# Patient Record
Sex: FEMALE | Race: BLACK OR AFRICAN AMERICAN | Employment: OTHER | URBAN - METROPOLITAN AREA
[De-identification: names, ages, dates, MRNs, and addresses within clinical notes are randomized per-mention and may not be internally consistent; named-entity substitution may affect disease eponyms.]

---

## 2017-08-28 ENCOUNTER — OFFICE VISIT (OUTPATIENT)
Dept: INTERNAL MEDICINE CLINIC | Age: 67
End: 2017-08-28

## 2017-08-28 VITALS
HEIGHT: 64 IN | DIASTOLIC BLOOD PRESSURE: 77 MMHG | HEART RATE: 57 BPM | BODY MASS INDEX: 32.5 KG/M2 | OXYGEN SATURATION: 100 % | WEIGHT: 190.4 LBS | TEMPERATURE: 97.8 F | RESPIRATION RATE: 18 BRPM | SYSTOLIC BLOOD PRESSURE: 158 MMHG

## 2017-08-28 DIAGNOSIS — Z78.0 POST-MENOPAUSAL: ICD-10-CM

## 2017-08-28 DIAGNOSIS — Z00.00 MEDICARE ANNUAL WELLNESS VISIT, SUBSEQUENT: Primary | ICD-10-CM

## 2017-08-28 DIAGNOSIS — R03.0 ELEVATED BP WITHOUT DIAGNOSIS OF HYPERTENSION: ICD-10-CM

## 2017-08-28 DIAGNOSIS — E78.2 MIXED HYPERLIPIDEMIA: ICD-10-CM

## 2017-08-28 DIAGNOSIS — Z12.31 ENCOUNTER FOR SCREENING MAMMOGRAM FOR BREAST CANCER: ICD-10-CM

## 2017-08-28 NOTE — MR AVS SNAPSHOT
Visit Information Date & Time Provider Department Dept. Phone Encounter #  
 8/28/2017 10:00 AM Simran Multani, [address] [phone] [id number] Follow-up Instructions Return in about 1 year (around 8/28/2018) for wellness visit. Upcoming Health Maintenance Date Due Hepatitis C Screening 1950 ZOSTER VACCINE AGE 60> 6/30/2008 GLAUCOMA SCREENING Q2Y 8/30/2013 Pneumococcal 65+ Low/Medium Risk (2 of 2 - PCV13) 11/26/2015 FOBT Q 1 YEAR AGE 50-75 6/30/2016 BREAST CANCER SCRN MAMMOGRAM 12/22/2017 MEDICARE YEARLY EXAM 8/29/2018 DTaP/Tdap/Td series (2 - Td) 6/1/2025 Allergies as of 8/28/2017  Review Complete On: 8/28/2017 By: Simran Multani MD  
 No Known Allergies Current Immunizations  Never Reviewed Name Date Influenza Vaccine 11/26/2014 Pneumococcal Conjugate (PCV-13)  Incomplete Pneumococcal Polysaccharide (PPSV-23) 11/26/2014 Tdap 6/1/2015 Not reviewed this visit You Were Diagnosed With   
  
 Codes Comments Medicare annual wellness visit, subsequent    -  Primary ICD-10-CM: Z00.00 ICD-9-CM: V70.0 Elevated BP without diagnosis of hypertension     ICD-10-CM: R03.0 ICD-9-CM: 796.2 Encounter for screening mammogram for breast cancer     ICD-10-CM: Z12.31 
ICD-9-CM: V76.12 Post-menopausal     ICD-10-CM: Z78.0 ICD-9-CM: V49.81 Mixed hyperlipidemia     ICD-10-CM: E78.2 ICD-9-CM: 272.2 Vitals BP Pulse Temp Resp Height(growth percentile) Weight(growth percentile) 158/77 (BP 1 Location: Left arm, BP Patient Position: Sitting) (!) 57 97.8 °F (36.6 °C) (Oral) 18 5' 4\" (1.626 m) 190 lb 6.4 oz (86.4 kg) SpO2 BMI OB Status Smoking Status 100% 32.68 kg/m2 Postmenopausal Former Smoker Vitals History BMI and BSA Data Body Mass Index Body Surface Area  
 32.68 kg/m 2 1.98 m 2 Preferred Pharmacy Pharmacy Name Phone [id number], [place] - [address] [phone] Your Updated Medication List  
  
   
This list is accurate as of: 17 11:14 AM.  Always use your most recent med list.  
  
  
  
  
 calcium 500 mg Tab Take 500 mg by mouth. Indications: HYPOCALCEMIA  
  
 COQ10  100-100 mg-unit Cap Generic drug:  coenzyme q10-vitamin e Take  by mouth.  
  
 krill oil-omega-3-dha-epa 300-90 (27-45) mg Cap Take  by mouth.  
  
 multivitamin tablet Commonly known as:  ONE A DAY Take 1 tablet by mouth daily. varicella zoster vacine live 19,400 unit/0.65 mL Susr injection Commonly known as:  varicella-zoster vacine live 1 Vial by SubCUTAneous route once for 1 dose. Indications: PREVENTION OF HERPES ZOSTER  
  
 VITAMIN D3 2,000 unit Tab Generic drug:  cholecalciferol (vitamin D3) Take  by mouth. Prescriptions Printed Refills  
 varicella zoster vacine live (VARICELLA-ZOSTER VACINE LIVE) 19,400 unit/0.65 mL susr injection 0 Si Vial by SubCUTAneous route once for 1 dose. Indications: PREVENTION OF HERPES ZOSTER Class: Print Route: SubCUTAneous We Performed the Following AMB POC HEMOGLOBIN A1C [82082 CPT(R)] LIPID PANEL [77405 CPT(R)] PNEUMOCOCCAL CONJ VACCINE 13 VALENT IM [id number] CPT(R)] Follow-up Instructions Return in about 1 year (around 2018) for wellness visit. To-Do List   
 2017 Imaging:  DEXA BONE DENSITY STUDY AXIAL   
  
 2017 Imaging:  NOHEMI MAMMO BI SCREENING INCL CAD Patient Instructions PATIENT INSTRUCTIONS:  
Prepared by Carol Wisdom Today's date: 17 Medicare Part B Preventive Services Guidelines/Limitations Date last completed and Frequency Due Date Bone Mass Measurement 
(age 72 & older, biennial) Requires diagnosis related to osteoporosis or estrogen deficiency.  Biennial benefit unless patient has history of long-term glucocorticoid tx or baseline is needed because initial test was by other method, or for patients with vertebral abnormalities on x-ray Completed:  
6-3-15 Recommended every 2 years Due: NOW At your discretion OR As recommended by your PCP or Specialist 
  
Cardiovascular Screening Blood Tests (every 5 years or annual if on cholesterol lowering meds) Total cholesterol, HDL, Triglycerides Order as a panel if possible Completed:  
3-27-15 As recommended by your PCP Due: 
March 2020 OR As recommended by your PCP or Specialist  
Hepatitis B vaccines (covered for patients at high risk- hemophilia, ESRD, DM, body fluid contact Three shot series covered once N/A N/A Zoster Shingles vaccine Covered by Medicare Part D through the pharmacy- PCP provides prescription Completed: No record found Recommended once over age 61  Due: NOW At your discretion This is a one-time vaccine Pneumococcal vaccine (once after age 72) 
 
 
_________________ Prevnar 15  
 
 
 
 
___________________ Completed:  
11-26-14 Recommended once over the age of 72 
__________________ Not Completed: 
 
 
Recommended once over the age of 72 This is a one-time  
vaccine 
 
 
_______________ Due: NOW At your discretion This is a one-time  
vaccine Colorectal Cancer Screening 
-Fecal occult blood test (annual) -Flexible sigmoidoscopy (5y) 
-Screening colonoscopy (10y) -Barium Enema Age 49-80; After age [de-identified] if history of abnormal results Completed:  
 6-30-15 Dr Remberto Riggins recommended repeat in 5 years  Due: 
June 2020 Counseling to Prevent Tobacco Use (up to 8 sessions per year) - Counseling greater than 3 and up to 10 minutes - Counseling greater than 10 minutes Patients must be asymptomatic of tobacco-related conditions to receive as preventive service N/A N/A Diabetes Screening Tests (at least every 3 years, Medicare covers annually or at 6-month intervals for prediabetic patients) Fasting blood sugar (FBS) or glucose tolerance test (GTT) Patient must be diagnosed with one of the following: 
-Hypertension, Dyslipidemia, obesity, previous impaired FBS or GTT 
Or any two of the following: overweight, FH of diabetes, age ? 72, history of gestational diabetes, birth of baby weighing more than 9 pounds Completed: No record found Recommended every 3 years for non-diabetics Recommended every 3-6 months for Pre-Diabetics and Diabetics Due: NOW 
 
OR 
As recommended by your PCP or Specialist 
  
Lung Cancer Screening-with low dose CT for patients who meet all criteria: 
-Age 50-69 
-either a current smoker or have quit in the past 15 yrs 
-have a smoking history of 30 pack years or more 
-have a written order from MD for screening Covered once every 12 months  N/A N/A Glaucoma Screening (no USPSTF recommendation) Covered for high risk patients such as patients with Diabetes mellitus, family history of glaucoma, , age 48 or over,  American, age 72 or over Completed: No record found Referral given for Dr. Pati Costa June 2016 Recommended annually Due: NOW Seasonal Influenza Vaccination (annually)  Completed: 
11-26-14 Recommended Annually Due: FALL 2017 At your discretion TDAP Vaccination Covered by Medicare part D through the pharmacy- PCP provides prescription Completed: 
 6-1-15 Recommended every 10 years Due: June 2025 At your discretion Screening Mammography (biennial age 54-69) Annually (age 36 or over) Completed:  
12-22-15 Due: NOW 
 
OR 
As recommended by your PCP or Specialist 
  
Screening Pap Tests and Pelvic Examination (up to age 79 and after 79 if unknown history or abnormal study last 10 years) Every 24 months except high risk Completed: 
3-26-15 As recommended by your PCP or Specialist 
 Due: NOW 
 
OR 
As recommended by your PCP or Specialist 
  
 HIV Screening (includes patients at high risk and includes any patient that requests the test and pregnant women Covered once every 12 months or up to 3 times during pregnancy N/A N/A Hepatitis C screening tests Indicated for patients with at least one of the following: current or past history of IV drug use, those who had blood transfusion before 1992, those born between [facility]. N/A N/A Please contact your RN/Nurse Navigator with any questions about your visit or instructions today. Thank you for the opportunity for us to participate in your care. Introducing [facility]! Dear Bassam Funk: Thank you for requesting a [employer] account. Our records indicate that you have previously registered for a [employer] account but its currently inactive. Please call our [employer] support line at [phone]. Additional Information If you have questions, please visit the Frequently Asked Questions section of the [employer] website at [email or web address] Remember, [employer] is NOT to be used for urgent needs. For medical emergencies, dial 911. Now available from your iPhone and Android! Please provide this summary of care documentation to your next provider. Your primary care clinician is listed as Morelia Mosley. If you have any questions after today's visit, please call [phone].

## 2017-08-28 NOTE — PROGRESS NOTES
Medicare Wellness Visit      Jamir Heath is a 74 y.o. female and presents for Annual Medicare Wellness Visit. Assessment of cognitive impairment: Alert and oriented x 3. Depression Screen:   PHQ over the last two weeks 8/28/2017   Little interest or pleasure in doing things Not at all   Feeling down, depressed or hopeless Not at all   Total Score PHQ 2 0       Fall Risk Assessment:    Fall Risk Assessment, last 12 mths 8/28/2017   Able to walk? Yes   Fall in past 12 months? No   Fall with injury? -   Number of falls in past 12 months -   Fall Risk Score -       Abuse Screen:   Abuse Screening Questionnaire 8/28/2017   Do you ever feel afraid of your partner? N   Are you in a relationship with someone who physically or mentally threatens you? N   Is it safe for you to go home? Y       Activities of Daily Living:  Self-care. Requires assistance with: no ADLs  Patient handle his/her own medications  yes Use of pill box  yes  Activities of Daily Living:   ADL Assessment 8/28/2017   Feeding yourself No Help Needed   Getting from bed to chair No Help Needed   Getting dressed No Help Needed   Bathing or showering No Help Needed   Walk across the room (includes cane/walker) No Help Needed   Using the telphone No Help Needed   Taking your medications No Help Needed   Preparing meals No Help Needed   Managing money (expenses/bills) No Help Needed   Moderately strenuous housework (laundry) No Help Needed   Shopping for personal items (toiletries/medicines) No Help Needed   Shopping for groceries No Help Needed   Driving No Help Needed   Climbing a flight of stairs No Help Needed   Getting to places beyond walking distances No Help Needed       Health Maintenance:  Daily Aspirin: no  Bone Density: not up to date - last done 6-3-15. Reviewed and recommended. Patient will consider for future  Glaucoma Screening: no-Reviewed and recommended.   Patient reports she will schedule eye exam soon  Immunizations:    Tetanus: up to date. Influenza: up to date. Next due: Fall 2017  Shingles: not up to date - patient agrees to immunization. Order pended for physician signature. PPSV-23: up to date. Prevnar-13: not up to date - patient agrees to immunization. Order pended for physician signature. .    Cancer screening:    Cervical: not up to date - Reviewed and recommended. Patient reports she will schedule. Breast: not up to date - Reviewed and recommended. Patient reports she will schedule. Colon: up to date-last colonoscopy 6-30-15 with Dr. Tesfaye Sharma; follow up in 5 years. Alcohol Risk Screen:   On any occasion during the past 3 months, have you had more than 3 drinks(female) or 4 drinks (male) containing alcohol in one? No  Do you average more than 7 drinks (female) or 14 drinks (male) per week? No  Type and amount:patient reports drinking occasional mixed drink    Hearing Loss:  denies any hearing loss    Vision Loss:   Wears glasses, contact lenses, or have any other visual impairment  Yes wears glasses for reading    Adult Nutrition Screen:  No risk factors noted. Advance Care Planning:   End of Life Planning: has NO advanced directive  - add't info provided      Medications/Allergies: Reviewed with patient  Prior to Admission medications    Medication Sig Start Date End Date Taking? Authorizing Provider   calcium 500 mg tab Take 500 mg by mouth. Indications: HYPOCALCEMIA   Yes Historical Provider   multivitamin (ONE A DAY) tablet Take 1 tablet by mouth daily. Yes Historical Provider   krill oil-omega-3-dha-epa 300-90 (27-45) mg cap Take  by mouth. Yes Historical Provider   coenzyme q10-vitamin e (COQ10 ) 100-100 mg-unit cap Take  by mouth. Yes Historical Provider   cholecalciferol, vitamin D3, (VITAMIN D3) 2,000 unit tab Take  by mouth.    Yes Historical Provider       No Known Allergies    Objective:  Visit Vitals    /77 (BP 1 Location: Left arm, BP Patient Position: Sitting)    Pulse (!) 57    Temp 97.8 °F (36.6 °C) (Oral)    Resp 18    Ht 5' 4\" (1.626 m)    Wt 190 lb 6.4 oz (86.4 kg)    SpO2 100%    BMI 32.68 kg/m2    Body mass index is 32.68 kg/(m^2). Problem List: Reviewed with patient and discussed risk factors. Patient Active Problem List   Diagnosis Code    Abnormal EKG R94.31    Murmur R01.1       PSH: Reviewed with patient  Past Surgical History:   Procedure Laterality Date    HX BUNIONECTOMY      HX  SECTION      HX ORTHOPAEDIC      left knee    HX OTHER SURGICAL      dental implant        SH: Reviewed with patient  Social History   Substance Use Topics    Smoking status: Former Smoker     Years: 20.00     Quit date: 10/26/2004    Smokeless tobacco: Never Used    Alcohol use Yes      Comment: occasionally       FH: Reviewed with patient  Family History   Problem Relation Age of Onset    Hypertension Mother     Heart Disease Mother     Alzheimer Mother     Hypertension Father     Hypertension Sister     Hypertension Brother     Alzheimer Maternal Aunt     Alzheimer Maternal Grandmother     Stroke Maternal Grandfather        Current medical providers:    Patient Care Team:  Rowan Ibrahim MD as PCP - General (Family Practice)  Magdy Bosch MD as Physician (Cardiology)  Herminia Concepcion MD as Physician (Gastroenterology)  Wanda Pacheco MD as Physician (Orthopedic Surgery)    Plan:      No orders of the defined types were placed in this encounter. 1. Patient will schedule and maintain health maintenance screens as discussed this visit  2. Patient will continue current exercise program  3. Patient will complete ADV document and supply to office for scanning into chart. Provided Right to Decide booklet and blank AMD document.       Health Maintenance   Topic Date Due    Hepatitis C Screening  1950    ZOSTER VACCINE AGE 60>  2008    GLAUCOMA SCREENING Q2Y  2013    Pneumococcal 65+ Low/Medium Risk (2 of 2 - PCV13) 11/26/2015    FOBT Q 1 YEAR AGE 50-75  06/30/2016    BREAST CANCER SCRN MAMMOGRAM  12/22/2017    MEDICARE YEARLY EXAM  08/29/2018    DTaP/Tdap/Td series (2 - Td) 06/01/2025    OSTEOPOROSIS SCREENING (DEXA)  Completed    INFLUENZA AGE 9 TO ADULT  Addressed         Urinary/ Fecal Incontinence: No    Regular physical exercise: Yes works in yard and uses a push  regularly    Medication reconciliation completed by MA and reviewed by provider. Family history and Care Team reviewed and updated. Patient provided with a copy of After Visit Summary and Medicare Part B Preventive Services Table. See table for findings under Recommendation and Scheduled. Patient Verbalized understanding of all information discussed       I have reviewed the information regarding the Medicare Annual Well Visit as documented by my nurse navigator; Agree with assessment.  Luis Enrique Stratton MD

## 2017-08-28 NOTE — PROGRESS NOTES
SUBJECTIVE:   Ms. Justus Lira is a 74 y.o. female who is here for follow up and her annual medicare wellness visit. Pt's BP is elevated at 158/77 today. Pt reports SBP averages 130-140s at home. Pt reports drinking 6 cups of coffee with some cream and sugar daily. Pt denies BLE swelling. Pt has lost 2 lbs since 2016. Pt reports walking and doing yard work for exercise. ROUTINE HEALTH MAINTENANCE:   Mammogram: due   Dexa: due   Shingles: prescription printed today     At this time, she is otherwise doing well and has brought no other complaints to my attention today. For a list of the medical issues addressed today, see the assessment and plan below. PMH: History reviewed. No pertinent past medical history. PSH:  has a past surgical history that includes  section; bunionectomy; orthopaedic; and other surgical.    All: has No Known Allergies. MEDS:   Current Outpatient Prescriptions   Medication Sig    varicella zoster vacine live (VARICELLA-ZOSTER VACINE LIVE) 19,400 unit/0.65 mL susr injection 1 Vial by SubCUTAneous route once for 1 dose. Indications: PREVENTION OF HERPES ZOSTER    calcium 500 mg tab Take 500 mg by mouth. Indications: HYPOCALCEMIA    multivitamin (ONE A DAY) tablet Take 1 tablet by mouth daily.  krill oil-omega-3-dha-epa 300-90 (27-45) mg cap Take  by mouth.  coenzyme q10-vitamin e (COQ10 ) 100-100 mg-unit cap Take  by mouth.  cholecalciferol, vitamin D3, (VITAMIN D3) 2,000 unit tab Take  by mouth. No current facility-administered medications for this visit. FH: family history includes Alzheimer in her maternal aunt, maternal grandmother, and mother; Heart Disease in her mother; Hypertension in her brother, father, mother, and sister; Stroke in her maternal grandfather. SH:  reports that she quit smoking about 12 years ago. She quit after 20.00 years of use. She has never used smokeless tobacco. She reports that she drinks alcohol.  She reports that she does not use illicit drugs. Review of Systems - History obtained from the patient  General ROS: negative  Psychological ROS: negative  Ophthalmic ROS: negative  ENT ROS: negative  Respiratory ROS: no cough, shortness of breath, or wheezing  Cardiovascular ROS: no chest pain or dyspnea on exertion  Gastrointestinal ROS: no abdominal pain, change in bowel habits, or black or bloody stools  Genito-Urinary ROS: negative  Musculoskeletal ROS: negative  Neurological ROS: negative  Dermatological ROS: negative    OBJECTIVE:   Vitals:   Visit Vitals    /77 (BP 1 Location: Left arm, BP Patient Position: Sitting)    Pulse (!) 57    Temp 97.8 °F (36.6 °C) (Oral)    Resp 18    Ht 5' 4\" (1.626 m)    Wt 190 lb 6.4 oz (86.4 kg)    SpO2 100%    BMI 32.68 kg/m2      Gen: Pleasant 74 y.o.  female in NAD. HEENT: NC/AT. HEART: RRR, No M/G/R. LUNGS: CTAB No W/R. EXTREMITIES: Warm. No C/C/E. NEURO: Alert and oriented x 3. Cranial nerves grossly intact. No focal sensory or motor deficits noted. SKIN: Warm. Dry. No rashes or other lesions noted. ASSESSMENT/ PLAN:     Diagnoses and all orders for this visit:    1. Medicare annual wellness visit, subsequent  -     AMB POC HEMOGLOBIN A1C  -     LIPID PANEL  -     PNEUMOCOCCAL CONJ VACCINE 13 VALENT IM  -     varicella zoster vacine live (VARICELLA-ZOSTER VACINE LIVE) 19,400 unit/0.65 mL susr injection; 1 Vial by SubCUTAneous route once for 1 dose. Indications: PREVENTION OF HERPES ZOSTER    2. Elevated BP without diagnosis of hypertension    3. Encounter for screening mammogram for breast cancer  -     [facility] MAMMO BI SCREENING INCL CAD; Future    4. Post-menopausal  -     DEXA BONE DENSITY STUDY AXIAL; Future    5. Mixed hyperlipidemia  -     LIPID PANEL      Pt was given lab orders for an Hgb A1c and lipid panel to have done when she is fasting. Pt was advised to continue checking her BP at home daily and send readings to this office.  Pt was advised to reduce caffeine intake to 3 cups of coffee daily rather than 6. Pt was instructed to monitor the amount of cream she uses in her coffee, and use [place] or another artificial sweetener in place of real sugar. Pt was given the Prevnar 13 vaccine in the office today and given a printed prescription for Zostavax. I ordered a mammogram for routine breast cancer screening. I ordered a Dexa bone density scan for routine monitoring of post-menopausal changes. Pt will f/u in one year for wellness visit. Follow-up Disposition:  Return in about 1 year (around 8/28/2018) for wellness visit. I have reviewed the patient's medications and risks/side effects/benefits were discussed. Diagnosis(-es) explained to patient and questions answered. Literature provided where appropriate.      Written by Tameka Alvarez, as dictated by Henna Cobos MD.

## 2017-08-28 NOTE — PATIENT INSTRUCTIONS
PATIENT INSTRUCTIONS:   Prepared by Stacey Nieto  Today's date: 8-28-17  Medicare Part B Preventive Services Guidelines/Limitations Date last completed and Frequency Due Date   Bone Mass Measurement  (age 72 & older, biennial) Requires diagnosis related to osteoporosis or estrogen deficiency. Biennial benefit unless patient has history of long-term glucocorticoid tx or baseline is needed because initial test was by other method, or for patients with vertebral abnormalities on x-ray Completed:   6-3-15    Recommended every 2 years Due: NOW    At your discretion    OR  As recommended by your PCP or Specialist     Cardiovascular Screening Blood Tests (every 5 years or annual if on cholesterol lowering meds)  Total cholesterol, HDL, Triglycerides Order as a panel if possible Completed:   3-27-15    As recommended by your PCP Due:  March 2020    OR  As recommended by your PCP or Specialist   Hepatitis B vaccines  (covered for patients at high risk- hemophilia, ESRD, DM, body fluid contact   Three shot series covered once     N/A N/A   Zoster Shingles vaccine Covered by Medicare Part D through the pharmacy- PCP provides prescription Completed:   No record found    Recommended once over age 61  Due: NOW    At your discretion    This is a one-time vaccine   Pneumococcal vaccine (once after age 72)      _________________ Isamar Naas 15           ___________________ Completed:   11-26-14    Recommended once over the age of 72  __________________  Not Completed:      Recommended once over the age of 72   This is a one-time   vaccine      _______________  Due: NOW    At your discretion    This is a one-time   vaccine   Colorectal Cancer Screening  -Fecal occult blood test (annual)  -Flexible sigmoidoscopy (5y)  -Screening colonoscopy (10y)  -Barium Enema Age 49-80;  After age [de-identified] if history of abnormal results Completed:    6-30-15     Dr Rene Coelho recommended repeat in 5 years  Due:  June 2020         Counseling to Prevent Tobacco Use (up to 8 sessions per year)  - Counseling greater than 3 and up to 10 minutes  - Counseling greater than 10 minutes   Patients must be asymptomatic of tobacco-related conditions to receive as preventive service N/A N/A   Diabetes Screening Tests (at least every 3 years, Medicare covers annually or at 6-month intervals for prediabetic patients)    Fasting blood sugar (FBS) or glucose tolerance test (GTT) Patient must be diagnosed with one of the following:  -Hypertension, Dyslipidemia, obesity, previous impaired FBS or GTT  Or any two of the following: overweight, FH of diabetes, age ? 72, history of gestational diabetes, birth of baby weighing more than 9 pounds Completed:   No record found    Recommended every 3 years for non-diabetics      Recommended every 3-6 months for Pre-Diabetics and Diabetics Due: NOW    OR  As recommended by your PCP or Specialist     Lung Cancer Screening-with low dose CT for patients who meet all criteria:  -Age 50-69  -either a current smoker or have quit in the past 15 yrs  -have a smoking history of 30 pack years or more  -have a written order from MD for screening   Covered once every 12 months  N/A N/A   Glaucoma Screening (no USPSTF recommendation) Covered for high risk patients such as patients with Diabetes mellitus, family history of glaucoma, , age 48 or over,  American, age 72 or over Completed:   No record found  Referral given for Dr. Lorrie Ortiz June 2016    Recommended annually Due: NOW   Seasonal Influenza Vaccination (annually)  Completed:  11-26-14     Recommended Annually Due: FALL 2017    At your discretion   TDAP Vaccination Covered by Medicare part D through the pharmacy- PCP provides prescription Completed:   6-1-15    Recommended every 10 years Due: June 2025    At your discretion   Screening Mammography (biennial age 54-69) Annually (age 36 or over) Completed:   12-22-15 Due: NOW    OR  As recommended by your PCP or Specialist     Screening Pap Tests and Pelvic Examination (up to age 79 and after 79 if unknown history or abnormal study last 10 years)   Every 24 months except high risk Completed:  3-26-15    As recommended by your PCP or Specialist   Due: NOW    OR  As recommended by your PCP or Specialist     HIV Screening (includes patients at high risk and includes any patient that requests the test and pregnant women   Covered once every 12 months or up to 3 times during pregnancy N/A N/A   Hepatitis C screening tests Indicated for patients with at least one of the following: current or past history of IV drug use, those who had blood transfusion before 1992, those born between [facility]. N/A N/A     Please contact your RN/Nurse Navigator with any questions about your visit or instructions today. Thank you for the opportunity for us to participate in your care.

## 2017-08-28 NOTE — PROGRESS NOTES
Chief Complaint   Patient presents with   [facility] Visit          1. Have you been to the ER, urgent care clinic since your last visit? Hospitalized since your last visit? No    2. Have you seen or consulted any other health care providers outside of the [address] [place] [address] since your last visit? Include any pap smears or colon screening.  No

## 2017-08-29 LAB
CHOLEST SERPL-MCNC: 223 MG/DL (ref 100–199)
HDLC SERPL-MCNC: 83 MG/DL
LDLC SERPL CALC-MCNC: 126 MG/DL (ref 0–99)
TRIGL SERPL-MCNC: 68 MG/DL (ref 0–149)
VLDLC SERPL CALC-MCNC: 14 MG/DL (ref 5–40)

## 2017-10-02 ENCOUNTER — HOSPITAL ENCOUNTER (OUTPATIENT)
Dept: BONE DENSITY | Age: 67
Discharge: HOME | End: 2017-10-02
Attending: FAMILY MEDICINE
Payer: MEDICARE

## 2017-10-02 ENCOUNTER — HOSPITAL ENCOUNTER (OUTPATIENT)
Dept: MAMMOGRAPHY | Age: 67
Discharge: HOME | End: 2017-10-02
Attending: FAMILY MEDICINE
Payer: MEDICARE

## 2017-10-02 DIAGNOSIS — Z12.31 ENCOUNTER FOR SCREENING MAMMOGRAM FOR BREAST CANCER: ICD-10-CM

## 2017-10-02 DIAGNOSIS — Z78.0 POST-MENOPAUSAL: ICD-10-CM

## 2017-10-02 PROCEDURE — 77080 DXA BONE DENSITY AXIAL: CPT

## 2017-10-02 PROCEDURE — 77067 SCR MAMMO BI INCL CAD: CPT

## 2017-10-06 ENCOUNTER — TELEPHONE (OUTPATIENT)
Dept: INTERNAL MEDICINE CLINIC | Age: 67
End: 2017-10-06

## 2017-10-06 NOTE — TELEPHONE ENCOUNTER
----- Message from Kina Raya MD sent at 10/5/2017 10:55 PM EDT -----  Please inform patient that she has osteopenia. She should take vit d 400 units bid and 600 mg of calcium bid. Encourage weight bearing exercise.

## 2017-10-06 NOTE — PROGRESS NOTES
Please inform patient that she has osteopenia. She should take vit d 400 units bid and 600 mg of calcium bid. Encourage weight bearing exercise.

## 2017-10-06 NOTE — TELEPHONE ENCOUNTER
Identified patient 2 identifiers verified. Patient aware of test results and plan of care. No further questions at this time.

## 2019-02-27 ENCOUNTER — OFFICE VISIT (OUTPATIENT)
Dept: INTERNAL MEDICINE CLINIC | Age: 69
End: 2019-02-27

## 2019-02-27 VITALS
HEART RATE: 54 BPM | BODY MASS INDEX: 32.3 KG/M2 | OXYGEN SATURATION: 98 % | WEIGHT: 182.3 LBS | SYSTOLIC BLOOD PRESSURE: 130 MMHG | DIASTOLIC BLOOD PRESSURE: 75 MMHG | RESPIRATION RATE: 18 BRPM | HEIGHT: 63 IN | TEMPERATURE: 97.6 F

## 2019-02-27 DIAGNOSIS — Z00.00 MEDICARE ANNUAL WELLNESS VISIT, SUBSEQUENT: Primary | ICD-10-CM

## 2019-02-27 DIAGNOSIS — Z13.6 SCREENING FOR ISCHEMIC HEART DISEASE: ICD-10-CM

## 2019-02-27 DIAGNOSIS — Z12.31 ENCOUNTER FOR SCREENING MAMMOGRAM FOR MALIGNANT NEOPLASM OF BREAST: ICD-10-CM

## 2019-02-27 DIAGNOSIS — Z13.39 SCREENING FOR ALCOHOLISM: ICD-10-CM

## 2019-02-27 DIAGNOSIS — Z13.31 SCREENING FOR DEPRESSION: ICD-10-CM

## 2019-02-27 DIAGNOSIS — Z11.59 NEED FOR HEPATITIS C SCREENING TEST: ICD-10-CM

## 2019-02-27 NOTE — PROGRESS NOTES
This is the Subsequent Medicare Annual Wellness Exam, performed 12 months or more after the Initial AWV or the last Subsequent AWV I have reviewed the patient's medical history in detail and updated the computerized patient record. History History reviewed. No pertinent past medical history. Past Surgical History:  
Procedure Laterality Date  HX BUNIONECTOMY  HX  SECTION    
 HX ORTHOPAEDIC    
 left knee  HX OTHER SURGICAL    
 dental implant Current Outpatient Medications Medication Sig Dispense Refill  varicella-zoster recombinant, PF, (SHINGRIX, PF,) 50 mcg/0.5 mL susr injection 0.5mL by IntraMUSCular route once now and then repeat in 2-6 months 0.5 mL 1  calcium 500 mg tab Take 500 mg by mouth. Indications: HYPOCALCEMIA  multivitamin (ONE A DAY) tablet Take 1 tablet by mouth daily.  krill oil-omega-3-dha-epa 300-90 (27-45) mg cap Take  by mouth.  coenzyme q10-vitamin e (COQ10 ) 100-100 mg-unit cap Take  by mouth.  cholecalciferol, vitamin D3, (VITAMIN D3) 2,000 unit tab Take  by mouth. No Known Allergies Family History Problem Relation Age of Onset  Hypertension Mother  Heart Disease Mother  Alzheimer Mother  Hypertension Father  Hypertension Sister  Hypertension Brother  Alzheimer Maternal Aunt  Alzheimer Maternal Grandmother  Stroke Maternal Grandfather Social History Tobacco Use  Smoking status: Former Smoker Years: 20.00 Last attempt to quit: 10/26/2004 Years since quittin.3  Smokeless tobacco: Never Used Substance Use Topics  Alcohol use: Yes Comment: occasionally Patient Active Problem List  
Diagnosis Code  Abnormal EKG R94.31  
 Murmur R01.1 Depression Risk Factor Screening:  
 
3 most recent PHQ Screens 2019 Little interest or pleasure in doing things Not at all Feeling down, depressed, irritable, or hopeless Not at all Total Score PHQ 2 0 Alcohol Risk Factor Screening: You do not drink alcohol or very rarely. Functional Ability and Level of Safety:  
Hearing Loss Hearing is good. Activities of Daily Living The home contains: no safety equipment. Patient does total self care Fall Risk Fall Risk Assessment, last 12 mths 2/27/2019 Able to walk? Yes Fall in past 12 months? No  
Fall with injury? -  
Number of falls in past 12 months - Fall Risk Score -  
 
 
Abuse Screen Patient is not abused Cognitive Screening Evaluation of Cognitive Function: 
Has your family/caregiver stated any concerns about your memory: yes Normal MMSE and Clock test 
 
Patient Care Team  
Patient Care Team: 
Evy Carson MD as PCP - [facility]) Estelita Del Rio MD as Physician (Cardiology) Moses Waldron MD as Physician (Gastroenterology) Shannan Jensen MD as Physician (Orthopedic Surgery) Assessment/Plan Education and counseling provided: 
Are appropriate based on today's review and evaluation End-of-Life planning (with patient's consent) Screening Mammography Cardiovascular screening blood test 
Screening for glaucoma Diagnoses and all orders for this visit: 
 
1. Medicare annual wellness visit, subsequent 
-     varicella-zoster recombinant, PF, (SHINGRIX, PF,) 50 mcg/0.5 mL susr injection; 0.5mL by IntraMUSCular route once now and then repeat in 2-6 months 2. Screening for alcoholism -     [place] ANNUAL ALCOHOL SCREEN 15 MIN 3. Screening for depression 
-     [address] 4. Screening for ischemic heart disease -     LIPID PANEL 5. Need for hepatitis C screening test 
-     HEPATITIS C AB 
 
6. Encounter for screening mammogram for malignant neoplasm of breast 
-     NOHEMI MAMMO BI SCREENING INCL CAD; Future Health Maintenance Due Topic Date Due  
 Hepatitis C Screening  1950  Shingrix Vaccine Age 50> (1 of 2) 08/30/1998  GLAUCOMA SCREENING Q2Y  08/30/2013  MEDICARE YEARLY EXAM  10/15/2018 PREVENTIVE: 
Colonoscopy: 6/30/15, Dr. Margy Lozano, 5yr f/u Mammogram: due; 10/2/17 Dexa: 10/2/17, osteopenic Tdap: 6/1/15 Pneumonia vaccine: PPSV-23: 11/26/14, PCV-13: 8/28/17 Shingrix: accepted, prescription provided Flu: 1/17/19 Eye Exam: due 
  
      ACP- Patient brought in a copy of her ACP today.

## 2019-02-27 NOTE — PROGRESS NOTES
Reviewed record in preparation for visit and have obtained necessary documentation. Identified pt with two pt identifiers(name and ). Chief Complaint Patient presents with [facility] [place] Annual Wellness Visit Health Maintenance Due Topic Date Due  
 Hepatitis C Test  1950  Shingles Vaccine (1 of 2) 1998  Glaucoma Screening   2013  Stool testing for trace blood  2016 [facility] [place] Annual Well Visit  10/15/2018 Ms. Augustin Freeman has a reminder for a \"due or due soon\" health maintenance. I have asked that she discuss this further with her primary care provider for follow-up on this health maintenance. Coordination of Care Questionnaire: 
:  
 
1) Have you been to an emergency room, urgent care clinic since your last visit? no  
 
Hospitalized since your last visit? no          
 
2) Have you seen or consulted any other health care providers outside of [address] [place] [address] since your last visit? no  (Include any pap smears or colon screenings in this section.) 3) In the event something were to happen to you and you were unable to speak on your behalf, do you have an Advance Directive/ Living Will in place stating your wishes? NO Do you have an Advance Directive on file? no 
 
4) Are you interested in receiving information on Advance Directives? YES Patient is accompanied by self I have received verbal consent from [facility] to discuss any/all medical information while they are present in the room.

## 2019-02-27 NOTE — PROGRESS NOTES
SUBJECTIVE:  
Ms. Jonna Sparks is a 77 y.o. female who is here for follow up of routine medical issues and Medicare Wellness Visit. Pt's weight today in the office was 182lb, down 8lb x . She reports she has increased her daily activity. Pt's BP was normal at 130/75. Pt endorses good energy levels. Pt reports memory changes. Pt reports she plays mentally stimulating games online. Pt notes her mother had Alzheimer's. Pt accepted a MMSE in the office today. Pt recently returned from a trip to [place]. Pt reports prior to her trip she went to the [facility] travel clinic and received the first immunizations for Hep A and Hep B. Pt reports she was told to complete the second dose. Pt denies CP, SOB, fatigue, changes in bowel habits, problems urinating, edema, skin lesions of concern. PREVENTIVE: 
Colonoscopy: 6/30/15, Dr. Tesfaye Sharma, 5yr f/u Mammogram: due; 10/2/17 Dexa: 10/2/17, osteopenic Tdap: 6/1/15 Pneumonia vaccine: PPSV-23: 14, PCV-13: 17 Shingrix: accepted, prescription provided Flu: 19 Eye Exam: due At this time, she is otherwise doing well and has brought no other complaints to my attention today. For a list of the medical issues addressed today, see the assessment and plan below. PMH: History reviewed. No pertinent past medical history. PSH:  has a past surgical history that includes hx  section; hx bunionectomy; hx orthopaedic; and hx other surgical. 
 
All: has No Known Allergies. MEDS:  
Current Outpatient Medications Medication Sig  varicella-zoster recombinant, PF, (SHINGRIX, PF,) 50 mcg/0.5 mL susr injection 0.5mL by IntraMUSCular route once now and then repeat in 2-6 months  calcium 500 mg tab Take 500 mg by mouth. Indications: HYPOCALCEMIA  multivitamin (ONE A DAY) tablet Take 1 tablet by mouth daily.  krill oil-omega-3-dha-epa 300-90 (27-45) mg cap Take  by mouth.  coenzyme q10-vitamin e (COQ10 ) 100-100 mg-unit cap Take  by mouth.  cholecalciferol, vitamin D3, (VITAMIN D3) 2,000 unit tab Take  by mouth. No current facility-administered medications for this visit. FH: family history includes Alzheimer in her maternal aunt, maternal grandmother, and mother; Heart Disease in her mother; Hypertension in her brother, father, mother, and sister; Stroke in her maternal grandfather. SH:  reports that she quit smoking about 14 years ago. She quit after 20.00 years of use. she has never used smokeless tobacco. She reports that she drinks alcohol. She reports that she does not use drugs. Review of Systems - History obtained from the patient General ROS: no fever, chills, fatigue, body aches Psychological ROS: no change in anxiety, depression, SI/HI Ophthalmic ROS: no blurred vision, myopia, double vision ENT ROS: no dysphagia, otalgia, otorrhea, rhinorrhea, post nasal drip Respiratory ROS: no cough, shortness of breath, or wheezing Cardiovascular ROS: no chest pain or dyspnea on exertion Gastrointestinal ROS: no abdominal pain, change in bowel habits, or black or bloody stools Genito-Urinary ROS: no frequency, urgency, incontinence, dysuria, hematuria Musculoskeletal ROS: no arthralgia, myalgia Neurological ROS: memory changes no headaches, dizziness, lightheadedness, tremors, seizures Dermatological ROS: no rash or lesions OBJECTIVE:  
Vitals:  
Visit Vitals /75 (BP 1 Location: Left arm, BP Patient Position: Sitting) Pulse (!) 54 Temp 97.6 °F (36.4 °C) (Oral) Resp 18 Ht 5' 3\" (1.6 m) Wt 182 lb 4.8 oz (82.7 kg) SpO2 98% BMI 32.29 kg/m² Gen: Pleasant 77 y.o.  female in NAD. HEENT: PERRLA. EOMI. OP moist and pink. TMs clear and canals equal BL. Neck: Supple. No LAD. HEART: RRR, No M/G/R.     
LUNGS: CTAB No W/R. ABDOMEN: S, NT, ND, BS+ EXTREMITIES: Warm. No C/C/E. MUSCULOSKELETAL: Normal ROM, muscle strength 5/5 all groups. NEURO: Alert and oriented x 3. Cranial nerves grossly intact. No focal sensory or motor deficits noted. SKIN: Warm. Dry. No rashes or other lesions noted. ASSESSMENT/ PLAN: Diagnoses and all orders for this visit: 
 
1. Medicare annual wellness visit, subsequent 
-     varicella-zoster recombinant, PF, (SHINGRIX, PF,) 50 mcg/0.5 mL susr injection; 0.5mL by IntraMUSCular route once now and then repeat in 2-6 months -     METABOLIC PANEL, COMPREHENSIVE 
-     CBC WITH AUTOMATED DIFF 2. Screening for alcoholism -     [place] ANNUAL ALCOHOL SCREEN 15 MIN 3. Screening for depression 
-     [address] 4. Screening for ischemic heart disease -     LIPID PANEL 5. Need for hepatitis C screening test 
-     HEPATITIS C AB 
 
6. Encounter for screening mammogram for malignant neoplasm of breast 
-     NOHEMI MAMMO BI SCREENING INCL CAD; Future ICD-10-CM ICD-9-CM 1. Medicare annual wellness visit, subsequent Z00.00 V70.0 varicella-zoster recombinant, PF, (SHINGRIX, PF,) 50 mcg/0.5 mL susr injection METABOLIC PANEL, COMPREHENSIVE  
   CBC WITH AUTOMATED DIFF 2. Screening for alcoholism Z13.39 V79.1 [place] ANNUAL ALCOHOL SCREEN 15 MIN 3. Screening for depression Z13.31 V79.0 [address] 4. Screening for ischemic heart disease Z13.6 V81.0 LIPID PANEL 5. Need for hepatitis C screening test Z11.59 V73.89 HEPATITIS C AB  
6. Encounter for screening mammogram for malignant neoplasm of breast Z12.31 V76.12 NOHEMI MAMMO BI SCREENING INCL CAD 1. Medicare Annual Wellness Visit See attached note. Pt was provided a prescription for shingrix to follow up at the pharmacy. A MMSE was performed in the office today; pt performed well with 100% accuracy. Recheck CMP and CBC. 2. Screening for alcoholism 3. Screening for depression 4. Screening for Ischemic Heart Disease Recheck lipid panel. 5. Hep C Screening Pt is a part of the age group that may have been exposed to Hep C in the past. I ordered a Hep C screening to test for exposure. 6. Screening mammogram 
Pt is due for a mammogram. Order given. Pt will follow up for her Hep B and Hep A vaccinations. Follow-up Disposition: 
Return in about 1 year (around 2/27/2020) for [employer]. I have reviewed the patient's medications and risks/side effects/benefits were discussed. Diagnosis(-es) explained to patient and questions answered. Literature provided where appropriate.   
 
Written by Marcelino Holstein, as dictated by Henna Cobos MD.

## 2019-02-27 NOTE — PATIENT INSTRUCTIONS
Medicare Wellness Visit, Female The best way to live healthy is to have a lifestyle where you eat a well-balanced diet, exercise regularly, limit alcohol use, and quit all forms of tobacco/nicotine, if applicable. Regular preventive services are another way to keep healthy. Preventive services (vaccines, screening tests, monitoring & exams) can help personalize your care plan, which helps you manage your own care. Screening tests can find health problems at the earliest stages, when they are easiest to treat. [facility] follows the current, evidence-based guidelines published by the [facility] Denilson Nicholas ([facility]) when recommending preventive services for our patients. Because we follow these guidelines, sometimes recommendations change over time as research supports it. (For example, mammograms used to be recommended annually. Even though Medicare will still pay for an annual mammogram, the newer guidelines recommend a mammogram every two years for women of average risk.) Of course, you and your doctor may decide to screen more often for some diseases, based on your risk and your health status. Preventive services for you include: - Medicare offers their members a free annual wellness visit, which is time for you and your primary care provider to discuss and plan for your preventive service needs. Take advantage of this benefit every year! 
-All adults over the age of 72 should receive the recommended pneumonia vaccines. Current USPSTF guidelines recommend a series of two vaccines for the best pneumonia protection.  
-All adults should have a flu vaccine yearly and a tetanus vaccine every 10 years. All adults age 61 and older should receive a shingles vaccine once in their lifetime.   
-A bone mass density test is recommended when a woman turns 65 to screen for osteoporosis. This test is only recommended one time, as a screening. Some providers will use this same test as a disease monitoring tool if you already have osteoporosis. -All adults age 38-68 who are overweight should have a diabetes screening test once every three years.  
-Other screening tests and preventive services for persons with diabetes include: an eye exam to screen for diabetic retinopathy, a kidney function test, a foot exam, and stricter control over your cholesterol.  
-Cardiovascular screening for adults with routine risk involves an electrocardiogram (ECG) at intervals determined by your doctor.  
-Colorectal cancer screenings should be done for adults age 54-65 with no increased risk factors for colorectal cancer. There are a number of acceptable methods of screening for this type of cancer. Each test has its own benefits and drawbacks. Discuss with your doctor what is most appropriate for you during your annual wellness visit. The different tests include: colonoscopy (considered the best screening method), a fecal occult blood test, a fecal DNA test, and sigmoidoscopy. -Breast cancer screenings are recommended every other year for women of normal risk, age 54-69. 
-Cervical cancer screenings for women over age 72 are only recommended with certain risk factors.  
-All adults born between [facility] should be screened once for Hepatitis C. Here is a list of your current Health Maintenance items (your personalized list of preventive services) with a due date: 
Health Maintenance Due Topic Date Due  
 Hepatitis C Test  1950  Shingles Vaccine (1 of 2) 08/30/1998  Glaucoma Screening   08/30/2013 [place] Annual Well Visit  10/15/2018

## 2019-02-28 LAB
ALBUMIN SERPL-MCNC: 4.1 G/DL (ref 3.5–4.8)
ALBUMIN/GLOB SERPL: 1.5 {RATIO} (ref 1.2–2.2)
ALP SERPL-CCNC: 86 IU/L (ref 39–117)
ALT SERPL-CCNC: 17 IU/L (ref 0–32)
AST SERPL-CCNC: 18 IU/L (ref 0–40)
BASOPHILS # BLD AUTO: 0 X10E3/UL (ref 0–0.2)
BASOPHILS NFR BLD AUTO: 1 %
BILIRUB SERPL-MCNC: <0.2 MG/DL (ref 0–1.2)
BUN SERPL-MCNC: 13 MG/DL (ref 8–27)
BUN/CREAT SERPL: 15 (ref 12–28)
CALCIUM SERPL-MCNC: 9.6 MG/DL (ref 8.7–10.3)
CHLORIDE SERPL-SCNC: 106 MMOL/L (ref 96–106)
CHOLEST SERPL-MCNC: 211 MG/DL (ref 100–199)
CO2 SERPL-SCNC: 21 MMOL/L (ref 20–29)
CREAT SERPL-MCNC: 0.86 MG/DL (ref 0.57–1)
EOSINOPHIL # BLD AUTO: 0.1 X10E3/UL (ref 0–0.4)
EOSINOPHIL NFR BLD AUTO: 3 %
ERYTHROCYTE [DISTWIDTH] IN BLOOD BY AUTOMATED COUNT: 14 % (ref 12.3–15.4)
GLOBULIN SER CALC-MCNC: 2.7 G/DL (ref 1.5–4.5)
GLUCOSE SERPL-MCNC: 86 MG/DL (ref 65–99)
HCT VFR BLD AUTO: 37.8 % (ref 34–46.6)
HCV AB S/CO SERPL IA: <0.1 S/CO RATIO (ref 0–0.9)
HDLC SERPL-MCNC: 84 MG/DL
HGB BLD-MCNC: 12.3 G/DL (ref 11.1–15.9)
IMM GRANULOCYTES # BLD AUTO: 0 X10E3/UL (ref 0–0.1)
IMM GRANULOCYTES NFR BLD AUTO: 0 %
LDLC SERPL CALC-MCNC: 115 MG/DL (ref 0–99)
LYMPHOCYTES # BLD AUTO: 1.7 X10E3/UL (ref 0.7–3.1)
LYMPHOCYTES NFR BLD AUTO: 43 %
MCH RBC QN AUTO: 29.5 PG (ref 26.6–33)
MCHC RBC AUTO-ENTMCNC: 32.5 G/DL (ref 31.5–35.7)
MCV RBC AUTO: 91 FL (ref 79–97)
MONOCYTES # BLD AUTO: 0.3 X10E3/UL (ref 0.1–0.9)
MONOCYTES NFR BLD AUTO: 7 %
NEUTROPHILS # BLD AUTO: 1.7 X10E3/UL (ref 1.4–7)
NEUTROPHILS NFR BLD AUTO: 46 %
PLATELET # BLD AUTO: 381 X10E3/UL (ref 150–379)
POTASSIUM SERPL-SCNC: 4.6 MMOL/L (ref 3.5–5.2)
PROT SERPL-MCNC: 6.8 G/DL (ref 6–8.5)
RBC # BLD AUTO: 4.17 X10E6/UL (ref 3.77–5.28)
SODIUM SERPL-SCNC: 142 MMOL/L (ref 134–144)
TRIGL SERPL-MCNC: 58 MG/DL (ref 0–149)
VLDLC SERPL CALC-MCNC: 12 MG/DL (ref 5–40)
WBC # BLD AUTO: 3.9 X10E3/UL (ref 3.4–10.8)

## 2019-03-01 NOTE — PROGRESS NOTES
Lipids-Your current lab results reveal a elevated total cholesterol,triglyceride, and ldl. Your total cholesterol should be under 200, triglycerides under 150, and your ldl under 100. Work on following a low fat diet and exercise at least three times a week. CBC-Normal red and white blood cell levels. Mild elevation in the platelets. CMP-Normal electrolyte levels, renal, and liver function.  
Hep C-negative

## 2019-03-27 ENCOUNTER — HOSPITAL ENCOUNTER (OUTPATIENT)
Dept: MAMMOGRAPHY | Age: 69
Discharge: HOME | End: 2019-03-27
Attending: FAMILY MEDICINE
Payer: MEDICARE

## 2019-03-27 DIAGNOSIS — Z12.31 ENCOUNTER FOR SCREENING MAMMOGRAM FOR MALIGNANT NEOPLASM OF BREAST: ICD-10-CM

## 2019-03-27 PROCEDURE — 77067 SCR MAMMO BI INCL CAD: CPT

## 2020-03-20 ENCOUNTER — TELEPHONE (OUTPATIENT)
Dept: INTERNAL MEDICINE CLINIC | Age: 70
End: 2020-03-20

## 2020-03-20 NOTE — TELEPHONE ENCOUNTER
Message was left for patient that Dr. Martha Morgan will not be in the office on Monday and to contact the office to reschedule.

## 2020-03-27 ENCOUNTER — TELEPHONE (OUTPATIENT)
Dept: INTERNAL MEDICINE CLINIC | Age: 70
End: 2020-03-27

## 2020-03-27 NOTE — TELEPHONE ENCOUNTER
Called, spoke with Pt. Two pt identifiers confirmed. Pt informed Dr. Pete Churchill is offering Telemedicine visits for her AWV. Pt stated no she can just wait until she can see her. Pt verbalized understanding of information discussed w/ no further questions at this time.

## 2020-11-02 ENCOUNTER — TELEPHONE (OUTPATIENT)
Dept: INTERNAL MEDICINE CLINIC | Age: 70
End: 2020-11-02

## 2020-11-02 NOTE — TELEPHONE ENCOUNTER
----- Message from Chichi Lee sent at 11/2/2020  3:55 PM EST -----  Regarding: Dr. Edwin Egan first and last name: pt      Reason for call: Np/est pt appt? Callback required yes/no and why: Yes, requesting appt      Best contact number(s): [phone]      Details to clarify the request: Previous pt of Dr. Manuel Anglin, however is a new pt with [facility]. Can she be seen?       Message from [facility]

## 2020-11-03 NOTE — TELEPHONE ENCOUNTER
Called, spoke with Pt. Two pt identifiers confirmed. Pt offered and accepted in person appt with Dr. Jaqueline Quiroga on 12/16/2020 at 10am.   Pt verbalized understanding of information discussed w/ no further questions at this time.

## 2020-12-10 ENCOUNTER — TELEPHONE (OUTPATIENT)
Dept: INTERNAL MEDICINE CLINIC | Age: 70
End: 2020-12-10

## 2020-12-10 NOTE — TELEPHONE ENCOUNTER
----- Message from [place] sent at 12/10/2020  3:30 PM EST -----  Regarding: Dr. Loni Blackwell, telephone  Contact: [phone]  12/16 at 10:00 a.m. Patient return call    Caller's first and last name and relationship (if not the patient): n/a      Best contact number(s): [phone]      Whose call is being returned: Staff      Details to clarify the request: Pt said she just missed the call. Message said they were wanting to change her 12/16, 10 a.m. appt from an in-office visit to a VV. Pt says this is fine with her. She will participate with your smart phone and the number is [phone]. Insurance was explained to her.         Message from [facility]

## 2020-12-10 NOTE — TELEPHONE ENCOUNTER
Spoke w/ pt, she was fine w/ changing the appt to a VV.  Keeping the same date and time of the original appt

## 2020-12-16 ENCOUNTER — VIRTUAL VISIT (OUTPATIENT)
Dept: INTERNAL MEDICINE CLINIC | Age: 70
End: 2020-12-16
Payer: MEDICARE

## 2020-12-16 DIAGNOSIS — Z12.31 ENCOUNTER FOR SCREENING MAMMOGRAM FOR MALIGNANT NEOPLASM OF BREAST: ICD-10-CM

## 2020-12-16 DIAGNOSIS — Z71.89 ADVANCED DIRECTIVES, COUNSELING/DISCUSSION: ICD-10-CM

## 2020-12-16 DIAGNOSIS — Z13.6 SCREENING FOR ISCHEMIC HEART DISEASE: ICD-10-CM

## 2020-12-16 DIAGNOSIS — I10 HYPERTENSION, ISOLATED SYSTOLIC: ICD-10-CM

## 2020-12-16 DIAGNOSIS — Z00.00 MEDICARE ANNUAL WELLNESS VISIT, SUBSEQUENT: Primary | ICD-10-CM

## 2020-12-16 DIAGNOSIS — R20.0 NUMBNESS OF FINGERS: ICD-10-CM

## 2020-12-16 PROCEDURE — 3017F COLORECTAL CA SCREEN DOC REV: CPT | Performed by: FAMILY MEDICINE

## 2020-12-16 PROCEDURE — G8427 DOCREV CUR MEDS BY ELIG CLIN: HCPCS | Performed by: FAMILY MEDICINE

## 2020-12-16 PROCEDURE — G8432 DEP SCR NOT DOC, RNG: HCPCS | Performed by: FAMILY MEDICINE

## 2020-12-16 PROCEDURE — G8421 BMI NOT CALCULATED: HCPCS | Performed by: FAMILY MEDICINE

## 2020-12-16 PROCEDURE — 99213 OFFICE O/P EST LOW 20 MIN: CPT | Performed by: FAMILY MEDICINE

## 2020-12-16 PROCEDURE — G0439 PPPS, SUBSEQ VISIT: HCPCS | Performed by: FAMILY MEDICINE

## 2020-12-16 PROCEDURE — G8536 NO DOC ELDER MAL SCRN: HCPCS | Performed by: FAMILY MEDICINE

## 2020-12-16 PROCEDURE — 1090F PRES/ABSN URINE INCON ASSESS: CPT | Performed by: FAMILY MEDICINE

## 2020-12-16 PROCEDURE — G8399 PT W/DXA RESULTS DOCUMENT: HCPCS | Performed by: FAMILY MEDICINE

## 2020-12-16 PROCEDURE — 1101F PT FALLS ASSESS-DOCD LE1/YR: CPT | Performed by: FAMILY MEDICINE

## 2020-12-16 PROCEDURE — G9899 SCRN MAM PERF RSLTS DOC: HCPCS | Performed by: FAMILY MEDICINE

## 2020-12-16 RX ORDER — AMLODIPINE BESYLATE 2.5 MG/1
2.5 TABLET ORAL DAILY
Qty: 30 TAB | Refills: 3 | Status: SHIPPED | OUTPATIENT
Start: 2020-12-16 | End: 2021-04-16

## 2020-12-16 NOTE — PROGRESS NOTES
Reviewed record in preparation for visit and have obtained necessary documentation. Identified pt with two pt identifiers(name and ). Chief Complaint   Patient presents with    Complete Physical       Health Maintenance Due   Topic Date Due    Shingles Vaccine (1 of 2) 1998    Glaucoma Screening   2013    Annual Well Visit  2020    Yearly Flu Vaccine (1) 2020       Ms. J Carlos Mujica has a reminder for a \"due or due soon\" health maintenance. I have asked that she discuss this further with her primary care provider for follow-up on this health maintenance. Coordination of Care Questionnaire:  :     1) Have you been to an emergency room, urgent care clinic since your last visit? no   Hospitalized since your last visit? no             2) Have you seen or consulted any other health care providers outside of [address] [place] [address] since your last visit? no  (Include any pap smears or colon screenings in this section.)    3) In the event something were to happen to you and you were unable to speak on your behalf, do you have an Advance Directive/ Living Will in place stating your wishes? NO    Do you have an Advance Directive on file? no    4) Are you interested in receiving information on Advance Directives? NO    Patient is accompanied by self I have received verbal consent from Lolita Newman to discuss any/all medical information while they are present in the room.

## 2020-12-16 NOTE — PROGRESS NOTES
1  This is the Subsequent Medicare Annual Wellness Exam, performed 12 months or more after the Initial AWV or the last Subsequent AWV    I have reviewed the patient's medical history in detail and updated the computerized patient record. Depression Risk Factor Screening:     3 most recent PHQ Screens 2/27/2019   Little interest or pleasure in doing things Not at all   Feeling down, depressed, irritable, or hopeless Not at all   Total Score PHQ 2 0       Alcohol Risk Screen    Do you average more than 1 drink per night or more than 7 drinks a week:  No    On any one occasion in the past three months have you have had more than 3 drinks containing alcohol:  No        Functional Ability and Level of Safety:    Hearing: Hearing is good. Activities of Daily Living: The home contains: no safety equipment. Patient does total self care      Ambulation: with no difficulty     Fall Risk:  Fall Risk Assessment, last 12 mths 2/27/2019   Able to walk? Yes   Fall in past 12 months? No   Fall with injury? -   Number of falls in past 12 months -   Fall Risk Score -      Abuse Screen:  Patient is not abused       Cognitive Screening    Has your family/caregiver stated any concerns about your memory: no        Assessment/Plan   Education and counseling provided:  Are appropriate based on today's review and evaluation  End-of-Life planning (with patient's consent)  Screening Mammography  Colorectal cancer screening tests  Cardiovascular screening blood test  Bone mass measurement (DEXA)    Diagnoses and all orders for this visit:    1. Medicare annual wellness visit, subsequent  -     LIPID PANEL  -     NOHEMI MAMMO BI SCREENING INCL CAD; Future    2. Hypertension, isolated systolic  -     amLODIPine (NORVASC) 2.5 mg tablet; Take 1 Tab by mouth daily. 3. Numbness of fingers  -     REFERRAL TO ORTHOPEDICS    4. Advanced directives, counseling/discussion    5. Screening for ischemic heart disease  -     LIPID PANEL    6. Encounter for screening mammogram for malignant neoplasm of breast  -     NOHEMI MAMMO BI SCREENING INCL CAD; Future        Health Maintenance Due     Health Maintenance Due   Topic Date Due    Shingrix Vaccine Age 49> (1 of 2) 1998    GLAUCOMA SCREENING Q2Y  2013    Medicare Yearly Exam  2020    Flu Vaccine (1) 2020       Patient Care Team   Patient Care Team:  Irvin Monahan MD as PCP - General (Family Medicine)  Irvin Monahan MD as PCP - [facility] Provider  Lindsay Vela MD as Physician (Cardiology)  Laurel Jalloh MD as Physician (Gastroenterology)  Artie Casas MD as Physician (Orthopedic Surgery)    History     Patient Active Problem List   Diagnosis Code    Abnormal EKG R94.31    Murmur R01.1     History reviewed. No pertinent past medical history. Past Surgical History:   Procedure Laterality Date    HX BUNIONECTOMY      HX  SECTION      HX ORTHOPAEDIC      left knee    HX OTHER SURGICAL      dental implant     Current Outpatient Medications   Medication Sig Dispense Refill    amLODIPine (NORVASC) 2.5 mg tablet Take 1 Tab by mouth daily. 30 Tab 3    calcium 500 mg tab Take 500 mg by mouth. Indications: HYPOCALCEMIA      multivitamin (ONE A DAY) tablet Take 1 tablet by mouth daily.  krill oil-omega-3-dha-epa 300-90 (27-45) mg cap Take  by mouth.  coenzyme q10-vitamin e (COQ10 ) 100-100 mg-unit cap Take  by mouth.  cholecalciferol, vitamin D3, (VITAMIN D3) 2,000 unit tab Take  by mouth.        No Known Allergies    Family History   Problem Relation Age of Onset    Hypertension Mother     Heart Disease Mother     Alzheimer Mother     Hypertension Father     Hypertension Sister     Hypertension Brother     Alzheimer Maternal Aunt     Alzheimer Maternal Grandmother     Stroke Maternal Grandfather      Social History     Tobacco Use    Smoking status: Former Smoker     Years: 20.00     Quit date: 10/26/2004 Years since quittin.1    Smokeless tobacco: Never Used   Substance Use Topics    Alcohol use: Yes     Comment: occasionally       Austin Hidalgo, who was evaluated through a synchronous (real-time) audio-video encounter, and/or her healthcare decision maker, is aware that it is a billable service, with coverage as determined by her insurance carrier. She provided verbal consent to proceed: Yes, and patient identification was verified. It was conducted pursuant to the emergency declaration under the [address] [place] [address], [address] [place] [address] authority and the [employer] and [employer] Act. A caregiver was present when appropriate. Ability to conduct physical exam was limited. I was at home. The patient was at home.     Justa Marinelli MD   [address]

## 2020-12-16 NOTE — PATIENT INSTRUCTIONS
Medicare Wellness Visit, Female The best way to live healthy is to have a lifestyle where you eat a well-balanced diet, exercise regularly, limit alcohol use, and quit all forms of tobacco/nicotine, if applicable. Regular preventive services are another way to keep healthy. Preventive services (vaccines, screening tests, monitoring & exams) can help personalize your care plan, which helps you manage your own care. Screening tests can find health problems at the earliest stages, when they are easiest to treat. [facility] follows the current, evidence-based guidelines published by the [facility] Peterson ([facility]) when recommending preventive services for our patients. Because we follow these guidelines, sometimes recommendations change over time as research supports it. (For example, mammograms used to be recommended annually. Even though Medicare will still pay for an annual mammogram, the newer guidelines recommend a mammogram every two years for women of average risk). Of course, you and your doctor may decide to screen more often for some diseases, based on your risk and your co-morbidities (chronic disease you are already diagnosed with). Preventive services for you include: - Medicare offers their members a free annual wellness visit, which is time for you and your primary care provider to discuss and plan for your preventive service needs. Take advantage of this benefit every year! 
-All adults over the age of 72 should receive the recommended pneumonia vaccines. Current USPSTF guidelines recommend a series of two vaccines for the best pneumonia protection.  
-All adults should have a flu vaccine yearly and a tetanus vaccine every 10 years.  
-All adults age 48 and older should receive the shingles vaccines (series of two vaccines). -All adults age 38-68 who are overweight should have a diabetes screening test once every three years. -All adults born between [birth year] and 1967 should be screened once for Hepatitis C. 
-Other screening tests and preventive services for persons with diabetes include: an eye exam to screen for diabetic retinopathy, a kidney function test, a foot exam, and stricter control over your cholesterol.  
-Cardiovascular screening for adults with routine risk involves an electrocardiogram (ECG) at intervals determined by your doctor.  
-Colorectal cancer screenings should be done for adults age 54-65 with no increased risk factors for colorectal cancer. There are a number of acceptable methods of screening for this type of cancer. Each test has its own benefits and drawbacks. Discuss with your doctor what is most appropriate for you during your annual wellness visit. The different tests include: colonoscopy (considered the best screening method), a fecal occult blood test, a fecal DNA test, and sigmoidoscopy. 
 
-A bone mass density test is recommended when a woman turns 65 to screen for osteoporosis. This test is only recommended one time, as a screening. Some providers will use this same test as a disease monitoring tool if you already have osteoporosis. -Breast cancer screenings are recommended every other year for women of normal risk, age 54-69. 
-Cervical cancer screenings for women over age 72 are only recommended with certain risk factors. Here is a list of your current Health Maintenance items (your personalized list of preventive services) with a due date: 
Health Maintenance Due Topic Date Due  Shingles Vaccine (1 of 2) 08/30/1998  Glaucoma Screening   08/30/2013 [address] [place] [address] Annual Well Visit  02/28/2020  Yearly Flu Vaccine (1) 09/01/2020

## 2020-12-16 NOTE — PROGRESS NOTES
Keenan Pandya is a 65 y.o. female who was seen by synchronous (real-time) audio-video technology on 12/16/2020 for Annual Wellness Visit      Mrs. Dugan calls in for evaluation of her blood pressure and concern about numbness in her right hand. She reports that she has noticed a systolic blood pressure in the 140s.  2 readings she has today is 140/78 and a reading of  146/75. She reports a strong family history of hypertension and she works hard to stay active and limit her sodium intake. Despite these positive lifestyle methods she continues to have systolic elevations. She reports that she is working in her yard and walking her dog for exercise. She did have her flu shot this year on October 21, 2020. She also reports some numbness in her right hand in the tips of the first 3 digits. This has been going on for some time. She denies any injury to the hand or the arm. PREVENTIVE:  Colonoscopy: 6/30/15, Dr. Leah Cool, 5yr f/u     Mammogram: due; 10/2/17    Dexa: 10/2/17, osteopenic     Tdap: 6/1/15     Pneumonia vaccine: PPSV-23: 11/26/14, PCV-13: 8/28/17     Shingrix: accepted, prescription provided     Flu: 1/17/19     Eye Exam: due           ACP- Patient brought in a copy of her ACP today. Assessment & Plan:   Diagnoses and all orders for this visit:    1. Hypertension, isolated systolic  -     amLODIPine (NORVASC) 2.5 mg tablet; Take 1 Tab by mouth daily. 2. Numbness of fingers  -     REFERRAL TO ORTHOPEDICS    Due to this patient's elevated systolic blood pressure she will begin taking small dose of amlodipine daily. She will monitor her blood pressures and report her readings to the office. She is encouraged to continue following a healthy balanced diet low in sodium. She also continue her consistent exercise activities. She was also given referral to orthopedics for further evaluation of the numbness in her hand.         Subjective:       Prior to Admission medications    Medication Sig Start Date End Date Taking? Authorizing Provider   amLODIPine (NORVASC) 2.5 mg tablet Take 1 Tab by mouth daily. 12/16/20  Yes Bang Vergara MD   calcium 500 mg tab Take 500 mg by mouth. Indications: HYPOCALCEMIA   Yes Provider, Historical   multivitamin (ONE A DAY) tablet Take 1 tablet by mouth daily. Yes Provider, Historical   krill oil-omega-3-dha-epa 300-90 (27-45) mg cap Take  by mouth. Yes Provider, Historical   coenzyme q10-vitamin e (COQ10 ) 100-100 mg-unit cap Take  by mouth. Yes Provider, Historical   cholecalciferol, vitamin D3, (VITAMIN D3) 2,000 unit tab Take  by mouth. Yes Provider, Historical         Review of Systems   Constitutional: Negative. HENT: Negative. Respiratory: Negative. Cardiovascular: Negative. Gastrointestinal: Negative. Genitourinary: Negative. Musculoskeletal: Negative. Skin: Negative. Neurological:        Numbness in the first 3 digits of the right hand       Objective:   No flowsheet data found.      [INSTRUCTIONS:  \"[x]\" Indicates a positive item  \"[]\" Indicates a negative item  -- DELETE ALL ITEMS NOT EXAMINED]    Constitutional: [x] Appears well-developed and well-nourished [x] No apparent distress      [] Abnormal -     Mental status: [x] Alert and awake  [x] Oriented to person/place/time [x] Able to follow commands    [] Abnormal -     Eyes:   EOM    [x]  Normal    [] Abnormal -   Sclera  [x]  Normal    [] Abnormal -          Discharge [x]  None visible   [] Abnormal -     HENT: [x] Normocephalic, atraumatic  [] Abnormal -   [x] Mouth/Throat: Mucous membranes are moist    External Ears [x] Normal  [] Abnormal -    Neck: [x] No visualized mass [] Abnormal -     Pulmonary/Chest: [x] Respiratory effort normal   [x] No visualized signs of difficulty breathing or respiratory distress        [] Abnormal -      Musculoskeletal:   [x] Normal gait with no signs of ataxia         [x] Normal range of motion of neck        [] Abnormal -     Neurological: [x] No Facial Asymmetry (Cranial nerve 7 motor function) (limited exam due to video visit)          [x] No gaze palsy        [] Abnormal -          Skin:        [x] No significant exanthematous lesions or discoloration noted on facial skin         [] Abnormal -            Psychiatric:       [x] Normal Affect [] Abnormal -        [x] No Hallucinations    Other pertinent observable physical exam findings:-        We discussed the expected course, resolution and complications of the diagnosis(es) in detail. Medication risks, benefits, costs, interactions, and alternatives were discussed as indicated. I advised her to contact the office if her condition worsens, changes or fails to improve as anticipated. She expressed understanding with the diagnosis(es) and plan. Joann Dewitt, who was evaluated through a patient-initiated, synchronous (real-time) audio-video encounter, and/or her healthcare decision maker, is aware that it is a billable service, with coverage as determined by her insurance carrier. She provided verbal consent to proceed: Yes, and patient identification was verified. It was conducted pursuant to the emergency declaration under the [address] [place] [address] authority and the [employer] and [employer] Act. A caregiver was present when appropriate. Ability to conduct physical exam was limited. I was at home. The patient was at home.       Kristal Rothman MD

## 2021-03-01 RX ORDER — MELATONIN
1000 DAILY
COMMUNITY

## 2021-03-01 NOTE — PERIOP NOTES
[facility]  Ambulatory Surgery Unit  Pre-operative Instructions    Surgery/Procedure Date  3/8            Tentative Arrival Time 07:45am      1. On the day of your surgery/procedure, please report to the Ambulatory Surgery Unit Registration Desk and sign in at your designated time. The Ambulatory Surgery Unit is located in [facility] on the [facility] side of the [facility] across from the [address] [place] [address]. Please have all of your health insurance cards and a photo ID. 2. You must have someone with you to drive you home, as you should not drive a car for 24 hours following anesthesia. Please make arrangements for a responsible adult friend or family member to stay with you for at least the first 24 hours after your surgery. 3. Do not have anything to eat or drink (including water, gum, mints, coffee, juice) after 11:59 PM  3/7. This may not apply to medications prescribed by your physician. (Please note below the special instructions with medications to take the morning of surgery, if applicable.)    4. We recommend you do not drink any alcoholic beverages for 24 hours before and after your surgery. 5. Contact your surgeons office for instructions on the following medications: non-steroidal anti-inflammatory drugs (i.e. Advil, Aleve), vitamins, and supplements. (Some surgeons will want you to stop these medications prior to surgery and others may allow you to take them)   **If you are currently taking Plavix, Coumadin, Aspirin and/or other blood-thinning agents, contact your surgeon for instructions. ** Your surgeon will partner with the physician prescribing these medications to determine if it is safe to stop or if you need to continue taking. Please do not stop taking these medications without instructions from your surgeon.     6. In an effort to help prevent surgical site infection, we ask that you shower with an anti-bacterial soap (i.e. Dial/Safeguard, or the soap provided to you at your preadmission testing appointment) for 3 days prior to and on the morning of surgery, using a fresh towel after each shower. (Please begin this process with fresh bed linens.) Do not apply any lotions, powders, or deodorants after the shower on the day of your procedure. If applicable, please do not shave the operative site for 48 hours prior to surgery. 7. Wear comfortable clothes. Wear glasses instead of contacts. Do not bring any jewelry or money (other than copays or fees as instructed). Do not wear make-up, particularly mascara, the morning of your surgery. Do not wear nail polish, particularly if you are having foot /hand surgery. Wear your hair loose or down, no ponytails, buns, toni pins or clips. All body piercings must be removed. 8. You should understand that if you do not follow these instructions your surgery may be cancelled. If your physical condition changes (i.e. fever, cold or flu) please contact your surgeon as soon as possible. 9. It is important that you be on time. If a situation occurs where you may be late, or if you have any questions or problems, please call [phone].    10. Your surgery time may be subject to change. You will receive a phone call the day prior to surgery to confirm your arrival time. Special Instructions: Take all medications and inhalers, as prescribed, on the morning of surgery with a sip of water EXCEPT: none      I understand a pre-operative phone call will be made to verify my surgery time. In the event that I am not available, I give permission for a message to be left on my answering service and/or with another person? yes    Reviewed by phone with pt, verbalized understanding.   Aware of recommendation to self quarantine post covid testing.     ___________________      ___________________      ________________  (Signature of Patient)          (Witness)                   (Date and Time)

## 2021-03-04 ENCOUNTER — TELEPHONE (OUTPATIENT)
Dept: INTERNAL MEDICINE CLINIC | Age: 71
End: 2021-03-04

## 2021-03-04 ENCOUNTER — HOSPITAL ENCOUNTER (OUTPATIENT)
Dept: PREADMISSION TESTING | Age: 71
Discharge: HOME | End: 2021-03-04
Payer: MEDICARE

## 2021-03-04 LAB — SARS-COV-2, COV2: NORMAL

## 2021-03-04 PROCEDURE — U0003 INFECTIOUS AGENT DETECTION BY NUCLEIC ACID (DNA OR RNA); SEVERE ACUTE RESPIRATORY SYNDROME CORONAVIRUS 2 (SARS-COV-2) (CORONAVIRUS DISEASE [COVID-19]), AMPLIFIED PROBE TECHNIQUE, MAKING USE OF HIGH THROUGHPUT TECHNOLOGIES AS DESCRIBED BY CMS-2020-01-R: HCPCS

## 2021-03-04 NOTE — TELEPHONE ENCOUNTER
Pt called to request advice from the doctor on whether or not she should get the covid vaccine because she is having surgery for her hand on Monday morning. Vaccine dates are next week. She states she is unsure if she should get the vaccine and have surgery.       Please call to advise  [phone] home

## 2021-03-04 NOTE — TELEPHONE ENCOUNTER
Identified patient 2 identifiers verified. Patient informed to contact Surgeon to getting  get with the COVOD vaccine after having surgery.

## 2021-03-05 ENCOUNTER — ANESTHESIA EVENT (OUTPATIENT)
Dept: SURGERY | Age: 71
End: 2021-03-05
Payer: MEDICARE

## 2021-03-05 LAB — SARS-COV-2, COV2NT: NOT DETECTED

## 2021-03-08 ENCOUNTER — HOSPITAL ENCOUNTER (OUTPATIENT)
Age: 71
Setting detail: OUTPATIENT SURGERY
Discharge: HOME | End: 2021-03-08
Attending: ORTHOPAEDIC SURGERY | Admitting: ORTHOPAEDIC SURGERY
Payer: MEDICARE

## 2021-03-08 ENCOUNTER — ANESTHESIA (OUTPATIENT)
Dept: SURGERY | Age: 71
End: 2021-03-08
Payer: MEDICARE

## 2021-03-08 VITALS
WEIGHT: 176 LBS | TEMPERATURE: 97.3 F | SYSTOLIC BLOOD PRESSURE: 119 MMHG | RESPIRATION RATE: 18 BRPM | BODY MASS INDEX: 30.05 KG/M2 | DIASTOLIC BLOOD PRESSURE: 62 MMHG | HEIGHT: 64 IN | OXYGEN SATURATION: 99 % | HEART RATE: 65 BPM

## 2021-03-08 DIAGNOSIS — G89.18 POSTOPERATIVE PAIN: Primary | ICD-10-CM

## 2021-03-08 PROCEDURE — 77030002916 HC SUT ETHLN J&J -A: Performed by: ORTHOPAEDIC SURGERY

## 2021-03-08 PROCEDURE — 77030040356 HC CORD BPLR FRCP COVD -A: Performed by: ORTHOPAEDIC SURGERY

## 2021-03-08 PROCEDURE — 74011250637 HC RX REV CODE- 250/637: Performed by: ORTHOPAEDIC SURGERY

## 2021-03-08 PROCEDURE — 77030021352 HC CBL LD SYS DISP COVD -B: Performed by: ORTHOPAEDIC SURGERY

## 2021-03-08 PROCEDURE — 74011250636 HC RX REV CODE- 250/636: Performed by: ORTHOPAEDIC SURGERY

## 2021-03-08 PROCEDURE — 74011000250 HC RX REV CODE- 250: Performed by: ORTHOPAEDIC SURGERY

## 2021-03-08 PROCEDURE — 74011000250 HC RX REV CODE- 250: Performed by: NURSE ANESTHETIST, CERTIFIED REGISTERED

## 2021-03-08 PROCEDURE — 76060000061 HC AMB SURG ANES 0.5 TO 1 HR: Performed by: ORTHOPAEDIC SURGERY

## 2021-03-08 PROCEDURE — 74011250636 HC RX REV CODE- 250/636: Performed by: NURSE ANESTHETIST, CERTIFIED REGISTERED

## 2021-03-08 PROCEDURE — 2709999900 HC NON-CHARGEABLE SUPPLY: Performed by: ORTHOPAEDIC SURGERY

## 2021-03-08 PROCEDURE — 76210000046 HC AMBSU PH II REC FIRST 0.5 HR: Performed by: ORTHOPAEDIC SURGERY

## 2021-03-08 PROCEDURE — 76210000040 HC AMBSU PH I REC FIRST 0.5 HR: Performed by: ORTHOPAEDIC SURGERY

## 2021-03-08 PROCEDURE — 74011250636 HC RX REV CODE- 250/636: Performed by: ANESTHESIOLOGY

## 2021-03-08 PROCEDURE — 77030000032 HC CUF TRNQT ZIMM -B: Performed by: ORTHOPAEDIC SURGERY

## 2021-03-08 PROCEDURE — 76030000000 HC AMB SURG OR TIME 0.5 TO 1: Performed by: ORTHOPAEDIC SURGERY

## 2021-03-08 RX ORDER — FENTANYL CITRATE 50 UG/ML
INJECTION, SOLUTION INTRAMUSCULAR; INTRAVENOUS AS NEEDED
Status: DISCONTINUED | OUTPATIENT
Start: 2021-03-08 | End: 2021-03-08 | Stop reason: HOSPADM

## 2021-03-08 RX ORDER — SODIUM CHLORIDE 9 MG/ML
INJECTION, SOLUTION INTRAVENOUS
Status: COMPLETED | OUTPATIENT
Start: 2021-03-08 | End: 2021-03-08

## 2021-03-08 RX ORDER — HYDROMORPHONE HYDROCHLORIDE 1 MG/ML
0.2 INJECTION, SOLUTION INTRAMUSCULAR; INTRAVENOUS; SUBCUTANEOUS
Status: DISCONTINUED | OUTPATIENT
Start: 2021-03-08 | End: 2021-03-08 | Stop reason: HOSPADM

## 2021-03-08 RX ORDER — SODIUM CHLORIDE 0.9 % (FLUSH) 0.9 %
5-40 SYRINGE (ML) INJECTION AS NEEDED
Status: DISCONTINUED | OUTPATIENT
Start: 2021-03-08 | End: 2021-03-08 | Stop reason: HOSPADM

## 2021-03-08 RX ORDER — SODIUM CHLORIDE 0.9 % (FLUSH) 0.9 %
5-40 SYRINGE (ML) INJECTION EVERY 8 HOURS
Status: DISCONTINUED | OUTPATIENT
Start: 2021-03-08 | End: 2021-03-08 | Stop reason: HOSPADM

## 2021-03-08 RX ORDER — SODIUM CHLORIDE, SODIUM LACTATE, POTASSIUM CHLORIDE, CALCIUM CHLORIDE 600; 310; 30; 20 MG/100ML; MG/100ML; MG/100ML; MG/100ML
25 INJECTION, SOLUTION INTRAVENOUS CONTINUOUS
Status: DISCONTINUED | OUTPATIENT
Start: 2021-03-08 | End: 2021-03-08 | Stop reason: HOSPADM

## 2021-03-08 RX ORDER — LIDOCAINE HYDROCHLORIDE 10 MG/ML
0.1 INJECTION, SOLUTION EPIDURAL; INFILTRATION; INTRACAUDAL; PERINEURAL AS NEEDED
Status: DISCONTINUED | OUTPATIENT
Start: 2021-03-08 | End: 2021-03-08 | Stop reason: HOSPADM

## 2021-03-08 RX ORDER — PROPOFOL 10 MG/ML
INJECTION, EMULSION INTRAVENOUS AS NEEDED
Status: DISCONTINUED | OUTPATIENT
Start: 2021-03-08 | End: 2021-03-08 | Stop reason: HOSPADM

## 2021-03-08 RX ORDER — PROPOFOL 10 MG/ML
INJECTION, EMULSION INTRAVENOUS
Status: DISCONTINUED | OUTPATIENT
Start: 2021-03-08 | End: 2021-03-08 | Stop reason: HOSPADM

## 2021-03-08 RX ORDER — ONDANSETRON 2 MG/ML
INJECTION INTRAMUSCULAR; INTRAVENOUS AS NEEDED
Status: DISCONTINUED | OUTPATIENT
Start: 2021-03-08 | End: 2021-03-08 | Stop reason: HOSPADM

## 2021-03-08 RX ORDER — FENTANYL CITRATE 50 UG/ML
25 INJECTION, SOLUTION INTRAMUSCULAR; INTRAVENOUS
Status: DISCONTINUED | OUTPATIENT
Start: 2021-03-08 | End: 2021-03-08 | Stop reason: HOSPADM

## 2021-03-08 RX ORDER — DEXAMETHASONE SODIUM PHOSPHATE 4 MG/ML
INJECTION, SOLUTION INTRA-ARTICULAR; INTRALESIONAL; INTRAMUSCULAR; INTRAVENOUS; SOFT TISSUE AS NEEDED
Status: DISCONTINUED | OUTPATIENT
Start: 2021-03-08 | End: 2021-03-08 | Stop reason: HOSPADM

## 2021-03-08 RX ORDER — EPHEDRINE SULFATE/0.9% NACL/PF 50 MG/5 ML
SYRINGE (ML) INTRAVENOUS AS NEEDED
Status: DISCONTINUED | OUTPATIENT
Start: 2021-03-08 | End: 2021-03-08 | Stop reason: HOSPADM

## 2021-03-08 RX ORDER — DIPHENHYDRAMINE HYDROCHLORIDE 50 MG/ML
12.5 INJECTION, SOLUTION INTRAMUSCULAR; INTRAVENOUS AS NEEDED
Status: DISCONTINUED | OUTPATIENT
Start: 2021-03-08 | End: 2021-03-08 | Stop reason: HOSPADM

## 2021-03-08 RX ORDER — TRAMADOL HYDROCHLORIDE 50 MG/1
50 TABLET ORAL
Qty: 20 TAB | Refills: 0 | Status: SHIPPED | OUTPATIENT
Start: 2021-03-08 | End: 2021-03-15

## 2021-03-08 RX ADMIN — WATER 2 G: 1 INJECTION INTRAMUSCULAR; INTRAVENOUS; SUBCUTANEOUS at 09:07

## 2021-03-08 RX ADMIN — FENTANYL CITRATE 12.5 MCG: 50 INJECTION, SOLUTION INTRAMUSCULAR; INTRAVENOUS at 09:15

## 2021-03-08 RX ADMIN — PROPOFOL 20 MG: 10 INJECTION, EMULSION INTRAVENOUS at 09:07

## 2021-03-08 RX ADMIN — FENTANYL CITRATE 12.5 MCG: 50 INJECTION, SOLUTION INTRAMUSCULAR; INTRAVENOUS at 09:11

## 2021-03-08 RX ADMIN — PROPOFOL 20 MG: 10 INJECTION, EMULSION INTRAVENOUS at 09:10

## 2021-03-08 RX ADMIN — Medication 5 MG: at 09:34

## 2021-03-08 RX ADMIN — FENTANYL CITRATE 12.5 MCG: 50 INJECTION, SOLUTION INTRAMUSCULAR; INTRAVENOUS at 09:17

## 2021-03-08 RX ADMIN — PROPOFOL 20 MG: 10 INJECTION, EMULSION INTRAVENOUS at 09:17

## 2021-03-08 RX ADMIN — ONDANSETRON HYDROCHLORIDE 4 MG: 2 INJECTION, SOLUTION INTRAMUSCULAR; INTRAVENOUS at 09:11

## 2021-03-08 RX ADMIN — SODIUM CHLORIDE, POTASSIUM CHLORIDE, SODIUM LACTATE AND CALCIUM CHLORIDE 25 ML/HR: 600; 310; 30; 20 INJECTION, SOLUTION INTRAVENOUS at 08:04

## 2021-03-08 RX ADMIN — FENTANYL CITRATE 12.5 MCG: 50 INJECTION, SOLUTION INTRAMUSCULAR; INTRAVENOUS at 09:19

## 2021-03-08 RX ADMIN — PROPOFOL 20 MG: 10 INJECTION, EMULSION INTRAVENOUS at 09:19

## 2021-03-08 RX ADMIN — Medication 3 AMPULE: at 08:04

## 2021-03-08 RX ADMIN — PROPOFOL 80 MCG/KG/MIN: 10 INJECTION, EMULSION INTRAVENOUS at 09:07

## 2021-03-08 RX ADMIN — DEXAMETHASONE SODIUM PHOSPHATE 4 MG: 4 INJECTION, SOLUTION INTRAMUSCULAR; INTRAVENOUS at 09:11

## 2021-03-08 RX ADMIN — Medication 5 MG: at 09:24

## 2021-03-08 RX ADMIN — FENTANYL CITRATE 25 MCG: 50 INJECTION, SOLUTION INTRAMUSCULAR; INTRAVENOUS at 09:07

## 2021-03-08 RX ADMIN — FENTANYL CITRATE 25 MCG: 50 INJECTION, SOLUTION INTRAMUSCULAR; INTRAVENOUS at 09:29

## 2021-03-08 NOTE — ANESTHESIA PREPROCEDURE EVALUATION
Anesthetic History   No history of anesthetic complications            Review of Systems / Medical History  Patient summary reviewed, nursing notes reviewed and pertinent labs reviewed    Pulmonary                Comments: Former smoker - Quit 2004   Neuro/Psych   Within defined limits           Cardiovascular    Hypertension          Past MI and CAD    Exercise tolerance: >4 METS  Comments: Nuclear Stress Test (2/18/15): Myocardial perfusion imaging is normal. Overall left ventricular   systolic function was normal. There is no old study for   comparison. These test results indicate low likelihood for the presence of   angiographically significant coronary artery disease. TTE (2/10/15): Left ventricle: Systolic function was normal. Ejection fraction was  estimated in the range of 60 % to 65 %. There were no regional wall motion  Abnormalities. ECG (2/10/15): Sinus  Bradycardia   Old anteroseptal infarct. GI/Hepatic/Renal  Within defined limits              Endo/Other  Within defined limits           Other Findings   Comments: Right Carpal Tunnel Syndrome         Physical Exam    Airway  Mallampati: IV  TM Distance: 4 - 6 cm  Neck ROM: normal range of motion   Mouth opening: Normal     Cardiovascular  Regular rate and rhythm,  S1 and S2 normal,  no murmur, click, rub, or gallop             Dental    Dentition: Caps/crowns  Comments: Several missing, none loose.    Pulmonary  Breath sounds clear to auscultation               Abdominal  GI exam deferred       Other Findings            Anesthetic Plan    ASA: 3  Anesthesia type: MAC          Induction: Intravenous  Anesthetic plan and risks discussed with: Patient

## 2021-03-08 NOTE — ANESTHESIA POSTPROCEDURE EVALUATION
Procedure(s):  RIGHT OPEN CARPAL TUNNEL RELEASE (LOCAL [facility]). MAC    Anesthesia Post Evaluation        Patient location during evaluation: PACU  Note status: Adequate. Level of consciousness: responsive to verbal stimuli and sleepy but conscious  Pain management: satisfactory to patient  Airway patency: patent  Anesthetic complications: no  Cardiovascular status: acceptable  Respiratory status: acceptable  Hydration status: acceptable  Comments: +Post-Anesthesia Evaluation and Assessment    Patient: Charbel Mendoza MRN: [id number]  SSN: [id number]   YOB: 1950  Age: 65 y.o. Sex: female      Cardiovascular Function/Vital Signs    /68 (BP 1 Location: Left upper arm, BP Patient Position: At rest)   Pulse 61   Temp 36.3 °C (97.3 °F)   Resp 17   Ht 5' 4\" (1.626 m)   Wt 79.8 kg (176 lb)   SpO2 99%   BMI 30.21 kg/m²     Patient is status post Procedure(s):  RIGHT OPEN CARPAL TUNNEL RELEASE (LOCAL W/MAC). Nausea/Vomiting: Controlled. Postoperative hydration reviewed and adequate. Pain:  Pain Scale 1: Numeric (0 - 10) (03/08/21 0950)  Pain Intensity 1: 0 (03/08/21 0950)   Managed. Neurological Status:   Neuro (WDL): Within Defined Limits (03/08/21 0943)   At baseline. Mental Status and Level of Consciousness: Arousable. Pulmonary Status:   O2 Device: Room air (03/08/21 0950)   Adequate oxygenation and airway patent. Complications related to anesthesia: None    Post-anesthesia assessment completed. No concerns.     Signed By: No Patel MD    3/8/2021  Post anesthesia nausea and vomiting:  controlled      INITIAL Post-op Vital signs:   Vitals Value Taken Time   /68 03/08/21 0950   Temp 36.3 °C (97.3 °F) 03/08/21 0944   Pulse 61 03/08/21 0950   Resp 17 03/08/21 0950   SpO2 99 % 03/08/21 0950

## 2021-03-08 NOTE — H&P
This is a 66 y/o RHD F who presents today with numbness in the right hand. This has been present for 6 months and has been getting worse. Dropping things. Almost constant. No neck pain, no h/o DM. Affects the thumb, index and long finger. No nocturnal symptoms. No pain at rest    PMH, PSH, ROS reviewed on intake form    Objective:   Constitutional: No acute distress. Well nourished. Well developed. Eyes: Sclera are nonicteric. Respiratory: No labored breathing. Cardiovascular: No marked edema. Skin: No marked skin ulcers. Neurological: see below  Psychiatric: Alert and oriented x3. Musculoskeletal   Right hand:  -there is thenar atrophy  -4.5/5 APB  -decreased median nerve sensation  -positive tinels and durkans at the wrist  -neck exam negatiev    Radiographs:       No imaging obtained     Assessment:     1. Carpal tunnel syndrome of right wrist     Plan:   Discussed nature of condition as well as treatment options. She does have severe right carpal tunnel syndrome so I have recommended surgical management. Risks, benefits and alternatives are discussed and she consents to proceed. Does have significant risk for limited or prolonged relief due to severity. Surgical date chosen. Date of Surgery Update:  Gerard Renteria was seen and examined. History and physical has been reviewed. The patient has been examined.  There have been no significant clinical changes since the completion of the originally dated History and Physical.    Signed By: Tatiana Rosales MD     March 8, 2021 8:43 AM

## 2021-03-08 NOTE — PERIOP NOTES
Permission received to review discharge instructions and discuss private health information with sister, Clay Lozano. Patient states that sister will be with them for at least 24 hours following today's procedure. Mistral-Air warming blanket applied at this time. Set to appropriate setting that is comfortable to patient. Will continue to monitor.

## 2021-03-08 NOTE — PERIOP NOTES
Alesha Sheehan  1950  [id number]    Situation:  Verbal report given from: RICARDO Wilcox RN and Mitchel Mcwilliams CRNA  Procedure: Procedure(s):  RIGHT OPEN CARPAL TUNNEL RELEASE ([facility])    Background:    Preoperative diagnosis: RIGHT CARPAL TUNNEL SYNDROME    Postoperative diagnosis: RIGHT CARPAL TUNNEL SYNDROME    :  Dr. Robert Mckeon    Assistant(s): Circ-1: Denilson Donald RN  Circ-Relief: Sunitha Castañeda  Scrub Tech-1: RT Carmen    Specimens: * No specimens in log *    Assessment:  Intra-procedure medications         Anesthesia gave intra-procedure sedation and medications, see anesthesia flow sheet     Intravenous fluids: LR@ KVO     Vital signs stable       Recommendation:    Permission to share finding with sister : yes

## 2021-03-08 NOTE — DISCHARGE INSTRUCTIONS
[facility]  Post-operative instructions  For: Mindi Garcai    Your first postop appointment should be scheduled with Dr. Ekaterina Knox for 2-3 weeks post-op. [address], [address]  [place]  Phone: [phone]  Hand Therapy Phone: [phone]  Fax: [phone]    Please follow these instructions for a safe and speedy recovery:    1. Surgical Bandage: Leave the bandage in place until 2 weeks after surgery. Please keep it clean and dry. To shower or bathe, apply a plastic bag or GLAD Press'n Seal® plastic wrap around the bandage or simply sponge bathe. After 2 weeks, you can remove the dressing and get incision wet but NO SOAKING. 2. Elevation: Hand swelling is best prevented by keeping your hand elevated above the level of your heart at all times, night and day. The opposite, dangling your hand below your waist, will cause additional pain, swelling, and later stiffness. You can elevate the hand in a sling or by propping it on a pillow at night. Occasionally, we will provide you with a custom-made foam block for elevating the arm. Ice compresses may help but do not rep[lace elevation. Frequently, extreme pain is caused by a tight bandage, which should be loosened. If pain is severe and progressive, call us at [phone] during the day (ask for immediate connection to Dr. Ulsyses Ibrahim Team) or during the night (ask for the on-call physician). 3. Medication: You will be provided with an appropriate pain medication (over-the-counter or prescription). Please fill this at a pharmacy promptly so you will have it available when all local anesthetic wears off. Take this to relieve pain as directed on the bottle. Please refrain from driving, drinking alcohol, and making important medical decisions while taking the medication. Please call us if you need something stronger.  Medication changes or refills must be made before 5pm or through your pharmacy. 4. Weight bearing: Do NOT bear any weight on the operative extremity for the first 2 weeks after surgery. After 2 weeks, you have a 5 pound weight lifting restriction. I want to thank you for choosing us for your hand care needs. My staff and I are committed to providing all our customers with the highest quality hand surgery and subsequent hand therapy care as possible. We want your recovery to be comfortable. If you have clinical non-emergent questions about your surgery or other hand conditions please call [phone] and ask for my team. Your call will be returned within 24 hours. DO NOT TAKE TYLENOL/ACETAMINOPHEN WITH PERCOCET, [facility]ORT[facility], [address] TAKE NARCOTIC PAIN MEDICATIONS WITH FOOD     Narcotics tend to be constipating, we suggest taking a stool softener such as Colace or Miralax (follow package instructions). DO NOT DRIVE WHILE TAKING NARCOTIC PAIN MEDICATIONS. DO NOT TAKE SLEEPING MEDICATIONS OR ANTIANXIETY MEDICATIONS WHILE TAKING NARCOTIC PAIN MEDICATIONS,  ESPECIALLY THE NIGHT OF ANESTHESIA! CPAP PATIENTS BE SURE TO WEAR MACHINE WHENEVER NAPPING OR SLEEPING! DISCHARGE SUMMARY from Nurse    The following personal items collected during your admission are returned to you:   Dental Appliance: Dental Appliances: Other (comment)(Upper dental implants)  Vision: Visual Aid: Glasses, At home  Hearing Aid:    Jewelry: Jewelry: With patient, Earrings(Put in pt belonging bag)  Clothing: Clothing: With patient  Other Valuables: Other Valuables: None  Valuables sent to safe:        PATIENT INSTRUCTIONS:    After General Anesthesia or Intravenous Sedation, for 24 hours or while taking prescription Narcotics:        Someone should be with you for the next 24 hours. For your own safety, a responsible adult must drive you home. · Limit your activities  · Recommended activity: Rest today, up with assistance today.  Do not climb stairs or shower unattended for the next 24 hours. · Please start with a soft bland diet and advance as tolerated (no nausea) to regular diet. · If you have a sore throat you should try the following: fluids, warm salt water gargles, or throat lozenges. If it does not improve after several days please follow up with your primary physician. · Do not drive and operate hazardous machinery  · Do not make important personal or business decisions  · Do  not drink alcoholic beverages  · If you have not urinated within 8 hours after discharge, please contact your surgeon on call. Report the following to your surgeon:  · Excessive pain, swelling, redness or odor of or around the surgical area  · Temperature over 100.5  · Nausea and vomiting lasting longer than 4 hours or if unable to take medications  · Any signs of decreased circulation or nerve impairment to extremity: change in color, persistent  numbness, tingling, coldness or increase pain      · You will receive a Post Operative Call from one of the Recovery Room Nurses on the day after your surgery to check on you. It is very important for us to know how you are recovering after your surgery. If you have an issue or need to speak with someone, please call your surgeon, do not wait for the post operative call. · You may receive an e-mail or letter in the mail from [employer] regarding your experience with us in the Ambulatory Surgery Unit. Your feedback is valuable to us and we appreciate your participation in the survey. · If the above instructions are not adequate or you are having problems after your surgery, call the physician at their office number. · We wish you a speedy recovery ? What to do at Home:      *  Please give a list of your current medications to your Primary Care Provider.     *  Please update this list whenever your medications are discontinued, doses are      changed, or new medications (including over-the-counter products) are added.    *  Please carry medication information at all times in case of emergency situations.    If you have not received your influenza and/or pneumococcal vaccine, please follow up with your primary care physician.    The discharge information has been reviewed with the patient and caregiver.  The patient and caregiver verbalized understanding.

## 2021-03-08 NOTE — OP NOTES
PATIENT NAME:  Kellie Hernández    SURGEON:  Valdemar Lazo MD    DATE OF SURGERY:  3/8/2021    LOCATION: [facility]    PREOPERATIVE DIAGNOSIS:   right carpal tunnel syndrome    POSTOPERATIVE DIAGNOSIS:  Same    PROCEDURE:  right Open carpal tunnel decompression             ANESTHESIA:  Local (1% lidocaine with 0.25% bupivicaine in a 1:1 mixture) with sedation     BLOOD LOSS:  Minimal    TOURNIQUET TIME:      OPERATIVE INDICATIONS: The patient is a 65 y.o. old female who has developed progressive right carpal tunnel syndrome, unresponsive to all conservative treatment. Symptoms have failed to respond consistently to conservative treatment such that patient has elected to undergo carpal tunnel decompression. She understands the alternatives to surgery, the nature of this elective procedure, the usual recovery, possible variations in healing, and the potential for shortcomings and complications (including but not exclusive to bleeding, infection, scar tenderness,  weakness, residual numbness, or thenar muscle weakness). DESCRIPTION  OF PROCEDURE: Patient identified correctly in the pre-operative holding area and correct extremity marked. Was then taken stable to the operating room and placed supine with the operative extremity on a hand table. After sedation was administered by the anesthesia team, the right hand and forearm were prepped and draped in a sterile field. A timeout was taken and the operative site was confirmed. Local anesthesia was instilled into the wound. The extremity was then elevated and exsanguinated and a forearm tourniquet was inflated to 200 mm of mercury. An incision was made in the proximal palm in line with the radial side of the ring finger from the distal wrist crease to Kaplans line. Dissection was carried through the subcutaneous tissue and the transverse carpal ligament was visualized.   This was released under direct visualization first distally followed by proximally and carried up past the wrist wrist crease. A complete decompression was confirmed by direct visualization of the decompressed median nerve as well as palpation. No other synovial pathology was noted. The tourniquet was then released. The nerve was noted to become hyperemic. Copious irrigation was performed. Hemostasis was obtained with bipolar cautery and the wound was closed with 3-0 nylon sutures. A sterile dressing was then applied leaving the fingers free for range of motion. The patient tolerated the procedure well and was discharged to the recovery area uneventfully. All instrument needle and lap counts were correct at the end of the case.

## 2021-03-08 NOTE — PERIOP NOTES
Patient received to PACU, VSS. Patient awake and alert, with no complaints of pain. Dressing to right hand intact. 1000: Discharge instructions given. Patient and daughter verbalize understanding of instructions and follow up appointment. 1014: Patient and daughter state ready for discharge. IV removed. Patient discharged at this time by wheelchair with belongings, daughter to provide transportation home.

## 2021-04-14 DIAGNOSIS — I10 HYPERTENSION, ISOLATED SYSTOLIC: ICD-10-CM

## 2021-04-16 RX ORDER — AMLODIPINE BESYLATE 2.5 MG/1
TABLET ORAL
Qty: 30 TAB | Refills: 3 | Status: SHIPPED | OUTPATIENT
Start: 2021-04-16 | End: 2021-08-19

## 2021-10-11 ENCOUNTER — OFFICE VISIT (OUTPATIENT)
Dept: INTERNAL MEDICINE CLINIC | Age: 71
End: 2021-10-11
Payer: MEDICARE

## 2021-10-11 VITALS
RESPIRATION RATE: 16 BRPM | SYSTOLIC BLOOD PRESSURE: 134 MMHG | HEART RATE: 72 BPM | WEIGHT: 160.2 LBS | TEMPERATURE: 97 F | BODY MASS INDEX: 27.35 KG/M2 | HEIGHT: 64 IN | OXYGEN SATURATION: 100 % | DIASTOLIC BLOOD PRESSURE: 72 MMHG

## 2021-10-11 DIAGNOSIS — I10 HYPERTENSION, ISOLATED SYSTOLIC: ICD-10-CM

## 2021-10-11 DIAGNOSIS — Z23 ENCOUNTER FOR IMMUNIZATION: ICD-10-CM

## 2021-10-11 DIAGNOSIS — R41.3 MEMORY CHANGE: ICD-10-CM

## 2021-10-11 DIAGNOSIS — Z00.00 MEDICARE ANNUAL WELLNESS VISIT, SUBSEQUENT: Primary | ICD-10-CM

## 2021-10-11 DIAGNOSIS — Z71.89 ADVANCED DIRECTIVES, COUNSELING/DISCUSSION: ICD-10-CM

## 2021-10-11 DIAGNOSIS — Z12.31 ENCOUNTER FOR SCREENING MAMMOGRAM FOR MALIGNANT NEOPLASM OF BREAST: ICD-10-CM

## 2021-10-11 DIAGNOSIS — Z78.0 POSTMENOPAUSAL STATE: ICD-10-CM

## 2021-10-11 DIAGNOSIS — Z13.6 SCREENING FOR ISCHEMIC HEART DISEASE: ICD-10-CM

## 2021-10-11 PROCEDURE — G8417 CALC BMI ABV UP PARAM F/U: HCPCS | Performed by: FAMILY MEDICINE

## 2021-10-11 PROCEDURE — 3017F COLORECTAL CA SCREEN DOC REV: CPT | Performed by: FAMILY MEDICINE

## 2021-10-11 PROCEDURE — 1101F PT FALLS ASSESS-DOCD LE1/YR: CPT | Performed by: FAMILY MEDICINE

## 2021-10-11 PROCEDURE — G0439 PPPS, SUBSEQ VISIT: HCPCS | Performed by: FAMILY MEDICINE

## 2021-10-11 PROCEDURE — G8399 PT W/DXA RESULTS DOCUMENT: HCPCS | Performed by: FAMILY MEDICINE

## 2021-10-11 PROCEDURE — G8510 SCR DEP NEG, NO PLAN REQD: HCPCS | Performed by: FAMILY MEDICINE

## 2021-10-11 PROCEDURE — G8536 NO DOC ELDER MAL SCRN: HCPCS | Performed by: FAMILY MEDICINE

## 2021-10-11 PROCEDURE — G9899 SCRN MAM PERF RSLTS DOC: HCPCS | Performed by: FAMILY MEDICINE

## 2021-10-11 PROCEDURE — G8752 SYS BP LESS 140: HCPCS | Performed by: FAMILY MEDICINE

## 2021-10-11 PROCEDURE — G8754 DIAS BP LESS 90: HCPCS | Performed by: FAMILY MEDICINE

## 2021-10-11 PROCEDURE — G0008 ADMIN INFLUENZA VIRUS VAC: HCPCS | Performed by: FAMILY MEDICINE

## 2021-10-11 PROCEDURE — G8427 DOCREV CUR MEDS BY ELIG CLIN: HCPCS | Performed by: FAMILY MEDICINE

## 2021-10-11 PROCEDURE — 90694 VACC AIIV4 NO PRSRV 0.5ML IM: CPT | Performed by: FAMILY MEDICINE

## 2021-10-11 NOTE — PROGRESS NOTES
This is the Subsequent Medicare Annual Wellness Exam, performed 12 months or more after the Initial AWV or the last Subsequent AWV    I have reviewed the patient's medical history in detail and updated the computerized patient record. Assessment/Plan   Education and counseling provided:  Are appropriate based on today's review and evaluation  End-of-Life planning (with patient's consent)  Influenza Vaccine  Screening Mammography  Cardiovascular screening blood test  Bone mass measurement (DEXA)  Screening for glaucoma    1. Medicare annual wellness visit, subsequent  -     FLU (FLUAD QUAD INFLUENZA VACCINE,QUAD,ADJUVANTED)  2. Advanced directives, counseling/discussion  3. Screening for ischemic heart disease  -     LIPID PANEL; Future  4. Encounter for screening mammogram for malignant neoplasm of breast  -     [facility] 3D GABRIELA W MAMMO BI SCREENING INCL CAD; Future  5. Postmenopausal state  -     DEXA BONE DENSITY STUDY AXIAL; Future  6. Encounter for immunization  -     FLU (FLUAD QUAD INFLUENZA VACCINE,QUAD,ADJUVANTED)  -     ADMIN INFLUENZA VIRUS VAC       Depression Risk Factor Screening     3 most recent PHQ Screens 10/11/2021   Little interest or pleasure in doing things Not at all   Feeling down, depressed, irritable, or hopeless Not at all   Total Score PHQ 2 0       Alcohol Risk Screen    Do you average more than 1 drink per night or more than 7 drinks a week:  No    On any one occasion in the past three months have you have had more than 3 drinks containing alcohol:  No        Functional Ability and Level of Safety    Hearing: Hearing is good. Activities of Daily Living: The home contains: no safety equipment. Patient does total self care      Ambulation: with no difficulty     Fall Risk:  Fall Risk Assessment, last 12 mths 10/11/2021   Able to walk? Yes   Fall in past 12 months? 1   Do you feel unsteady?  0   Are you worried about falling 0   Number of falls in past 12 months 1   Fall with injury? 0      Abuse Screen:  Patient is not abused       Cognitive Screening    Has your family/caregiver stated any concerns about your memory: yes - refer neuropsych         Health Maintenance Due     Health Maintenance Due   Topic Date Due    COVID-19 Vaccine (1) Never done    Shingrix Vaccine Age 50> (1 of 2) Never done    Breast Cancer Screen Mammogram  2021    Flu Vaccine (1) 2021       Patient Care Team   Patient Care Team:  Tanya Ramirez MD as PCP - General (Family Medicine)  Tanya Ramirez MD as [facility]  Britt Grimes MD as Physician (Cardiology)  Lamin Palacios MD as Physician (Gastroenterology)  Roni Castillo MD as Physician (Orthopedic Surgery)    History     Patient Active Problem List   Diagnosis Code    Abnormal EKG R94.31    Murmur R01.1     Past Medical History:   Diagnosis Date    Hypertension     Old MI (myocardial infarction)     old anteroseptal infarct per EKG saw cardio 2015 negative testing      Past Surgical History:   Procedure Laterality Date    HX BUNIONECTOMY Right     HX  SECTION      HX KNEE ARTHROSCOPY Left     HX OTHER SURGICAL      dental implant     Current Outpatient Medications   Medication Sig Dispense Refill    amLODIPine (NORVASC) 2.5 mg tablet TAKE 1 TABLET BY MOUTH EVERY DAY 30 Tablet 1    cholecalciferol (VITAMIN D3) (1000 Units /25 mcg) tablet Take 1,000 Units by mouth daily.  calcium 500 mg tab Take 500 mg by mouth daily. Indications: low amount of calcium in the blood      multivitamin (ONE A DAY) tablet Take 1 tablet by mouth daily.  krill oil-omega-3-dha-epa 300-90 (27-45) mg cap Take 1 Tab by mouth daily.  coenzyme q10-vitamin e (COQ10 ) 100-100 mg-unit cap Take 1 Tab by mouth daily.        No Known Allergies    Family History   Problem Relation Age of Onset    Hypertension Mother     Heart Disease Mother     Alzheimer Mother     Hypertension Father    [employer] Hypertension Sister     Hypertension Brother     Alzheimer Maternal Aunt     Alzheimer Maternal Grandmother     Stroke Maternal Grandfather      Social History     Tobacco Use    Smoking status: Former Smoker     Years: 20.00     Quit date: 10/26/2004     Years since quittin.9    Smokeless tobacco: Never Used   Substance Use Topics    Alcohol use: Yes     Comment: rare, not monthly         Gin Duncan MD

## 2021-10-11 NOTE — PATIENT INSTRUCTIONS

## 2021-10-11 NOTE — PROGRESS NOTES
SUBJECTIVE:   Andreina Graff is a 66 y.o. female who is here for annual wellness visit. MAWV: Pt reports doing well. She inquires about memory testing d/t family history. Pt specifically denies changes in vision or hearing, trouble with swallowing or taste, CP, SOB, heartburn or upset stomach, change in bowel habits, problems urinating, unusual joint or muscle pains, numbness or tingling in extremities, or skin lesions of concern. HTN: Pt's HTN is stable on her current medication regimen. Pt's BP during today's visit 134/72. Memory Changes: Pt indicates being concerned about a change in memory. She notes memory problems run in her family. Pt remarks wanting to move forward with memory testing. At this time, she is otherwise doing well and has brought no other complaints to my attention today. For a list of the medical issues addressed today, see the assessment and plan below. PMH:   Past Medical History:   Diagnosis Date    Hypertension     Old MI (myocardial infarction)     old anteroseptal infarct per EKG saw cardio 2015 negative testing       PSH:  has a past surgical history that includes hx  section; hx bunionectomy (Right); hx other surgical; and hx knee arthroscopy (Left). Allergies: has No Known Allergies. Meds:   Current Outpatient Medications   Medication Sig    amLODIPine (NORVASC) 2.5 mg tablet TAKE 1 TABLET BY MOUTH EVERY DAY    cholecalciferol (VITAMIN D3) (1000 Units /25 mcg) tablet Take 1,000 Units by mouth daily.  calcium 500 mg tab Take 500 mg by mouth daily. Indications: low amount of calcium in the blood    multivitamin (ONE A DAY) tablet Take 1 tablet by mouth daily.  krill oil-omega-3-dha-epa 300-90 (27-45) mg cap Take 1 Tab by mouth daily.  coenzyme q10-vitamin e (COQ10 ) 100-100 mg-unit cap Take 1 Tab by mouth daily. No current facility-administered medications for this visit.        Fam hx: family history includes Alzheimer in her maternal aunt, maternal grandmother, and mother; Heart Disease in her mother; Hypertension in her brother, father, mother, and sister; Stroke in her maternal grandfather. Soc hx:  reports that she quit smoking about 16 years ago. She quit after 20.00 years of use. She has never used smokeless tobacco. She reports current alcohol use. She reports that she does not use drugs. Review of Systems - History obtained from the patient  General ROS: negative  Psychological ROS: negative  Ophthalmic ROS: negative  ENT ROS: negative  Respiratory ROS: no cough, shortness of breath, or wheezing  Cardiovascular ROS: no chest pain or dyspnea on exertion  Gastrointestinal ROS: no abdominal pain, change in bowel habits, or black or bloody stools  Genito-Urinary ROS: negative  Musculoskeletal ROS: negative  Neurological ROS: negative  Dermatological ROS: negative    OBJECTIVE:   Vitals:   Visit Vitals  /72 (BP 1 Location: Right arm, BP Patient Position: Sitting, BP Cuff Size: Adult)   Pulse 72   Temp 97 °F (36.1 °C) (Temporal)   Resp 16   Ht 5' 4\" (1.626 m)   Wt 160 lb 3.2 oz (72.7 kg)   SpO2 100%   BMI 27.50 kg/m²     Gen: Pleasant 66 y.o. female in NAD. HEENT: PERRLA. EOMI. OP moist and pink. EARS: TMs normal and canals equal bilaterally. NECK: Supple. No LAD. No thyromegaly. HEART: RRR, No M/G/R.     LUNGS: CTAB No W/R. ABDOMEN: S, NT, ND, BS+. EXTREMITIES: Warm. No C/C/E.    MUSCULOSKELETAL: Normal ROM, muscle strength 5/5 all groups. NEURO: Alert and oriented x 3. Cranial nerves grossly intact. No focal sensory or motor deficits noted. SKIN: Warm. Dry. No rashes or other lesions noted. ASSESSMENT/ PLAN:     Diagnoses and all orders for this visit:    1. Medicare annual wellness visit, subsequent  -     FLU (FLUAD QUAD INFLUENZA VACCINE,QUAD,ADJUVANTED)  -     REFERRAL TO NEUROPSYCHOLOGY    2. Advanced directives, counseling/discussion    3.  Screening for ischemic heart disease  - LIPID PANEL; Future    4. Encounter for screening mammogram for malignant neoplasm of breast  -     NOHEMI 3D GABRILEA W MAMMO BI SCREENING INCL CAD; Future    5. Postmenopausal state  -     DEXA BONE DENSITY STUDY AXIAL; Future    6. Encounter for immunization  -     FLU (FLUAD QUAD INFLUENZA VACCINE,QUAD,ADJUVANTED)  -     ADMIN INFLUENZA VIRUS VAC    7. Memory change  -     REFERRAL TO NEUROPSYCHOLOGY    8. Hypertension, isolated systolic  -     METABOLIC PANEL, COMPREHENSIVE; Future  -     CBC WITH AUTOMATED DIFF; Future      1. Medicare Annual Wellness Visit  See attached note. 2. Advanced directives, counseling/discussion   I counseled the pt on the importance of advanced directives. ACP planning continued today. I explained the purpose of the advance medical directive. Patient expressed interest in reviewing material.  I provided the patient with a \"Your Right to Decide\" booklet, [employer] Kit, and a copy of an Advance Directive. Patient agrees to providing a copy to the office when available.           3. Screening for Ischemic Heart Disease  Recheck lipid panel. 4. Encounter for screening mammogram for malignant neoplasm of breast   Ordered mama 3D gabriela w mammo bi screening incl cad for health maintenance. 5. Post menopausal state   Ordered dexa bone density study axial for health maintenance. 6. Encounter for immunization  Ordered Flu vaccine. 7. Memory Change   Referral to Dr. Thelma Marsh (neuropsychology) for further memory testing. 8. Hypertension  BP seems to be well controlled. I recommended continuing current dose of amlodipine 2.5 mg tablet, eating a low sodium diet, and increasing exercise. Recheck CMP and CBC. Follow-up and Dispositions    · Return in about 6 months (around 4/11/2022), or if symptoms worsen or fail to improve. I have reviewed the patient's medications and risks/side effects/benefits were discussed.  Diagnosis(-es) explained to patient and questions answered. Literature provided where appropriate.      Written by Liliana Diehl, as dictated by Kai Gomez MD.

## 2021-10-11 NOTE — PROGRESS NOTES
Chief Complaint   Patient presents with    Physical     1. Have you been to the ER, urgent care clinic since your last visit? Hospitalized since your last visit? No    2. Have you seen or consulted any other health care providers outside of the [address] [place] [address] since your last visit? Include any pap smears or colon screening.  No     Visit Vitals  /72 (BP 1 Location: Right arm, BP Patient Position: Sitting, BP Cuff Size: Adult)   Pulse 72   Temp 97 °F (36.1 °C) (Temporal)   Resp 16   Ht 5' 4\" (1.626 m)   Wt 160 lb 3.2 oz (72.7 kg)   SpO2 100%   BMI 27.50 kg/m²

## 2021-10-15 DIAGNOSIS — I10 HYPERTENSION, ISOLATED SYSTOLIC: ICD-10-CM

## 2021-10-15 RX ORDER — AMLODIPINE BESYLATE 2.5 MG/1
TABLET ORAL
Qty: 30 TABLET | Refills: 1 | Status: SHIPPED | OUTPATIENT
Start: 2021-10-15 | End: 2021-12-13

## 2021-11-19 ENCOUNTER — TELEPHONE (OUTPATIENT)
Dept: INTERNAL MEDICINE CLINIC | Age: 71
End: 2021-11-19

## 2021-11-19 NOTE — TELEPHONE ENCOUNTER
was not able to do the mammogram and bone density today due to just getting the covid booster.  she is rescheduled for soonest in January. [facility] just wants to advise .

## 2021-12-13 DIAGNOSIS — I10 HYPERTENSION, ISOLATED SYSTOLIC: ICD-10-CM

## 2021-12-13 RX ORDER — AMLODIPINE BESYLATE 2.5 MG/1
TABLET ORAL
Qty: 30 TABLET | Refills: 1 | Status: SHIPPED | OUTPATIENT
Start: 2021-12-13 | End: 2022-02-15

## 2022-01-11 ENCOUNTER — HOSPITAL ENCOUNTER (OUTPATIENT)
Dept: MAMMOGRAPHY | Age: 72
Discharge: HOME | End: 2022-01-11
Attending: FAMILY MEDICINE
Payer: MEDICARE

## 2022-01-11 DIAGNOSIS — Z12.31 ENCOUNTER FOR SCREENING MAMMOGRAM FOR MALIGNANT NEOPLASM OF BREAST: ICD-10-CM

## 2022-01-11 DIAGNOSIS — Z78.0 POSTMENOPAUSAL STATE: ICD-10-CM

## 2022-01-11 PROCEDURE — 77063 BREAST TOMOSYNTHESIS BI: CPT

## 2022-01-11 PROCEDURE — 77080 DXA BONE DENSITY AXIAL: CPT

## 2022-01-12 ENCOUNTER — PATIENT MESSAGE (OUTPATIENT)
Dept: INTERNAL MEDICINE CLINIC | Age: 72
End: 2022-01-12

## 2022-09-20 DIAGNOSIS — I10 HYPERTENSION, ISOLATED SYSTOLIC: ICD-10-CM

## 2022-09-23 RX ORDER — AMLODIPINE BESYLATE 2.5 MG/1
TABLET ORAL
Qty: 30 TABLET | Refills: 0 | Status: SHIPPED | OUTPATIENT
Start: 2022-09-23 | End: 2022-10-21

## 2022-11-07 ENCOUNTER — OFFICE VISIT (OUTPATIENT)
Dept: INTERNAL MEDICINE CLINIC | Age: 72
End: 2022-11-07
Payer: MEDICARE

## 2022-11-07 VITALS
WEIGHT: 153.4 LBS | SYSTOLIC BLOOD PRESSURE: 115 MMHG | HEART RATE: 58 BPM | RESPIRATION RATE: 16 BRPM | HEIGHT: 64 IN | DIASTOLIC BLOOD PRESSURE: 66 MMHG | OXYGEN SATURATION: 98 % | BODY MASS INDEX: 26.19 KG/M2 | TEMPERATURE: 98.2 F

## 2022-11-07 DIAGNOSIS — I10 HYPERTENSION, ISOLATED SYSTOLIC: ICD-10-CM

## 2022-11-07 DIAGNOSIS — Z13.31 SCREENING FOR DEPRESSION: ICD-10-CM

## 2022-11-07 DIAGNOSIS — Z13.6 SCREENING FOR ISCHEMIC HEART DISEASE: ICD-10-CM

## 2022-11-07 DIAGNOSIS — Z00.00 MEDICARE ANNUAL WELLNESS VISIT, SUBSEQUENT: Primary | ICD-10-CM

## 2022-11-07 PROCEDURE — G8754 DIAS BP LESS 90: HCPCS | Performed by: FAMILY MEDICINE

## 2022-11-07 PROCEDURE — G0439 PPPS, SUBSEQ VISIT: HCPCS | Performed by: FAMILY MEDICINE

## 2022-11-07 PROCEDURE — G8536 NO DOC ELDER MAL SCRN: HCPCS | Performed by: FAMILY MEDICINE

## 2022-11-07 PROCEDURE — G8752 SYS BP LESS 140: HCPCS | Performed by: FAMILY MEDICINE

## 2022-11-07 PROCEDURE — 3017F COLORECTAL CA SCREEN DOC REV: CPT | Performed by: FAMILY MEDICINE

## 2022-11-07 PROCEDURE — G8399 PT W/DXA RESULTS DOCUMENT: HCPCS | Performed by: FAMILY MEDICINE

## 2022-11-07 PROCEDURE — G8510 SCR DEP NEG, NO PLAN REQD: HCPCS | Performed by: FAMILY MEDICINE

## 2022-11-07 PROCEDURE — G9899 SCRN MAM PERF RSLTS DOC: HCPCS | Performed by: FAMILY MEDICINE

## 2022-11-07 PROCEDURE — 1101F PT FALLS ASSESS-DOCD LE1/YR: CPT | Performed by: FAMILY MEDICINE

## 2022-11-07 PROCEDURE — G8427 DOCREV CUR MEDS BY ELIG CLIN: HCPCS | Performed by: FAMILY MEDICINE

## 2022-11-07 PROCEDURE — G8417 CALC BMI ABV UP PARAM F/U: HCPCS | Performed by: FAMILY MEDICINE

## 2022-11-07 NOTE — PROGRESS NOTES
SUBJECTIVE:   Ms. Laci Hernadez is a 74 y.o. female who is here for follow up of routine medical issues. Pt presents for her medicare annual wellness visit. Pt underwent a right open carpal tunnel release on 3/8/2021. She reports numbness with a gradual decrease in strength of the hand. Pt also notes associated difficulty flexing or extending. She indicates this creates problems with grasping objects. Pt denies catching or locking of the hand. She is not interested in seeing a specialist for this. She reports concerns with memory loss and notes a family history from her mother. She is interested in a memory test.    She has lost 7 lbs from 160 lbs on 10/11/21 to 153 lbs today, 2022. She notes getting exercise from working in her lawn. Pt reports intermittent tinnitus in her left ear. HTN: Pt is compliant in taking amlodipine 2.5 mg daily. Pt's BP in the office today was 115/66. Patient denies chest pain, VICK/SOB, edema, headache, visual changes, dizziness, palpitations or syncope. PREVENTIVE:  Colonoscopy: 22  Mammogram: 22  Dexa: 22  COVID: 3 doses  Flu: due  Shingles: due    Pt specifically denies changes in vision, trouble with swallowing or taste, CP, SOB, heartburn or upset stomach, change in bowel habits, problems urinating, unusual joint or muscle pains, numbness or tingling in extremities, skin lesions of concern, changes in mood or feelings of depression. At this time, she is otherwise doing well and has brought no other complaints to my attention today. For a list of the medical issues addressed today, see the assessment and plan below. PMH:   Past Medical History:   Diagnosis Date    Hypertension     Old MI (myocardial infarction)     old anteroseptal infarct per EKG saw cardio 2015 negative testing     PSH:  has a past surgical history that includes hx  section; hx bunionectomy (Right); hx other surgical; and hx knee arthroscopy (Left).     All: has No Known Allergies. MEDS:   Current Outpatient Medications   Medication Sig    amLODIPine (NORVASC) 2.5 mg tablet TAKE 1 TABLET BY MOUTH EVERY DAY    cholecalciferol (VITAMIN D3) (1000 Units /25 mcg) tablet Take 1,000 Units by mouth daily. calcium 500 mg tab Take 500 mg by mouth daily. Indications: low amount of calcium in the blood    multivitamin (ONE A DAY) tablet Take 1 tablet by mouth daily. krill oil-omega-3-dha-epa 300-90 (27-45) mg cap Take 1 Tab by mouth daily. coenzyme q10-vitamin e 100-100 mg-unit cap Take 1 Tab by mouth daily. (Patient not taking: Reported on 11/7/2022)     No current facility-administered medications for this visit. FH: family history includes Alzheimer's Disease in her maternal aunt, maternal grandmother, and mother; Heart Disease in her mother; Hypertension in her brother, father, mother, and sister; Stroke in her maternal grandfather. SH:  reports that she quit smoking about 18 years ago. Her smoking use included cigarettes. She has never used smokeless tobacco. She reports current alcohol use. She reports that she does not use drugs.      Review of Systems - History obtained from the patient  General ROS: no fever, chills, fatigue, body aches  Psychological ROS: no change in anxiety, depression, SI/HI  Ophthalmic ROS: no blurred vision, myopia, double vision  ENT ROS: no dysphagia, otalgia, otorrhea, rhinorrhea, post nasal drip, +tinnitus left ear  Respiratory ROS: no cough, shortness of breath, or wheezing  Cardiovascular ROS: no chest pain or dyspnea on exertion  Gastrointestinal ROS: no abdominal pain, change in bowel habits, or black or bloody stools  Genito-Urinary ROS: no frequency, urgency, incontinence, dysuria, hematouria  Musculoskeletal ROS: no arthralagia, myalgia  Neurological ROS: no headaches, dizziness, lightheadedness, tremors, seizures  Dermatological ROS: no rash or lesions    OBJECTIVE:   Vitals: Visit Vitals  /66 (BP 1 Location: Left arm, BP Patient Position: Sitting, BP Cuff Size: Large adult)   Pulse (!) 58   Temp 98.2 °F (36.8 °C) (Temporal)   Resp 16   Ht 5' 4\" (1.626 m)   Wt 153 lb 6.4 oz (69.6 kg)   SpO2 98%   BMI 26.33 kg/m²      Gen: Pleasant 74 y.o.  female in NAD. HEENT: PERRLA. EOMI. OP moist and pink. Neck: Supple. No LAD. +left ear cerumen impaction  HEART: RRR, No M/G/R.      LUNGS: CTAB No W/R. ABDOMEN: S, NT, ND, BS+. EXTREMITIES: Warm. No C/C/E.    MUSCULOSKELETAL: Normal ROM, muscle strength 5/5 all groups. NEURO: Alert and oriented x 3. Cranial nerves grossly intact. No focal sensory or motor deficits noted. SKIN: Warm. Dry. No rashes or other lesions noted. ASSESSMENT/ PLAN: Diagnoses and all orders for this visit:    1. Medicare annual wellness visit, subsequent  -     REFERRAL TO NEUROPSYCHOLOGY    2. Screening for ischemic heart disease  -     LIPID PANEL; Future    3. Screening for depression  -     DEPRESSION SCREEN ANNUAL    4. Hypertension, isolated systolic  -     METABOLIC PANEL, COMPREHENSIVE; Future  -     CBC WITH AUTOMATED DIFF; Future    1. Medicare annual wellness visit, subsequent  1. Medicare Annual Wellness Visit  See attached note. Pt's mother experienced memory changes and the pt does not want to undergo the same changes. She expressed interest for cognitive tests as well as treatment if necessary. I have referred her to neuropsychology. I also conducted a mini-mental state examination and saw no significant abnormalities. She scored a 29 out of 30 and the clock test was normal.  -     REFERRAL TO NEUROPSYCHOLOGY    2. Screening for ischemic heart disease  I have ordered a lipid panel to screen for ischemic heart disease.  -     LIPID PANEL; Future    3. Screening for depression  Pt denied any mood changes on her depression screening. Her main source of apprehension was regarding her memory and she fears undergoing the same changes her mother went through.   -     DEPRESSION SCREEN ANNUAL    4. Hypertension, isolated systolic  BP seems to be well controlled. I recommended continuing current dose of medications, eating a low sodium diet, and increasing exercise. Recheck CMP and CBC.     -     METABOLIC PANEL, COMPREHENSIVE; Future  -     CBC WITH AUTOMATED DIFF; Future    I recommended ways to alleviate tenderness in her right hand. I suspect that some of the problems are a result of arthritis and hereditary. She also has a  trigger finger. She does not want to see orthopedics at this time. ICD-10-CM ICD-9-CM    1. Medicare annual wellness visit, subsequent  Z00.00 V70.0 REFERRAL TO NEUROPSYCHOLOGY      2. Screening for ischemic heart disease  Z13.6 V81.0 LIPID PANEL      3. Screening for depression  Z13.31 V79.0 DEPRESSION SCREEN ANNUAL      4. Hypertension, isolated systolic  M03 308.7 METABOLIC PANEL, COMPREHENSIVE      CBC WITH AUTOMATED DIFF           Follow-up and Dispositions    Return in about 6 months (around 5/7/2023), or if symptoms worsen or fail to improve. I have reviewed the patient's medications and risks/side effects/benefits were discussed. Diagnosis(-es) explained to patient and questions answered. Literature provided where appropriate.      Written by Joan Ramires, as dictated by Jessi Goins MD.

## 2022-11-07 NOTE — PROGRESS NOTES
This is the Subsequent Medicare Annual Wellness Exam, performed 12 months or more after the Initial AWV or the last Subsequent AWV    I have reviewed the patient's medical history in detail and updated the computerized patient record. Assessment/Plan   Education and counseling provided:  Are appropriate based on today's review and evaluation  End-of-Life planning (with patient's consent)  Screening Mammography  Cardiovascular screening blood test  Screening for glaucoma    1. Medicare annual wellness visit, subsequent  2. Screening for ischemic heart disease  -     LIPID PANEL; Future  3. Screening for depression  -     DEPRESSION SCREEN ANNUAL       Depression Risk Factor Screening     3 most recent PHQ Screens 11/7/2022   Little interest or pleasure in doing things Not at all   Feeling down, depressed, irritable, or hopeless Not at all   Total Score PHQ 2 0       Alcohol & Drug Abuse Risk Screen    Do you average more than 1 drink per night or more than 7 drinks a week:  No    On any one occasion in the past three months have you have had more than 3 drinks containing alcohol:  No          Functional Ability and Level of Safety    Hearing: The patient needs further evaluation. Activities of Daily Living: The home contains: no safety equipment. Patient does total self care      Ambulation: with no difficulty     Fall Risk:  Fall Risk Assessment, last 12 mths 10/11/2021   Able to walk? Yes   Fall in past 12 months? 1   Do you feel unsteady? 0   Are you worried about falling 0   Number of falls in past 12 months 1   Fall with injury?  0      Abuse Screen:  Patient is not abused       Cognitive Screening    Has your family/caregiver stated any concerns about your memory: yes     Cognitive Screening: Normal - MMSE (Mini Mental Status Exam)    Health Maintenance Due     Health Maintenance Due   Topic Date Due    COVID-19 Vaccine (1) Never done    Shingrix Vaccine Age 50> (1 of 2) Never done    Flu Vaccine (1) 2022    Depression Screen  10/11/2022       Patient Care Team   Patient Care Team:  Jose Roberto Turpin MD as PCP - General (Family Medicine)  Jose Roberto Turpin MD as PCP - [facility]  Chelsey Oliveros MD as Physician (Cardiovascular Disease Physician)  Chin Levine MD as Physician (Gastroenterology)  Ally Torres MD as Physician (Orthopedic Surgery)    History     Patient Active Problem List   Diagnosis Code    Abnormal EKG R94.31    Murmur R01.1     Past Medical History:   Diagnosis Date    Hypertension     Old MI (myocardial infarction)     old anteroseptal infarct per EKG saw cardio 2015 negative testing      Past Surgical History:   Procedure Laterality Date    HX BUNIONECTOMY Right     HX  SECTION      HX KNEE ARTHROSCOPY Left     HX OTHER SURGICAL      dental implant     Current Outpatient Medications   Medication Sig Dispense Refill    amLODIPine (NORVASC) 2.5 mg tablet TAKE 1 TABLET BY MOUTH EVERY DAY 30 Tablet 5    cholecalciferol (VITAMIN D3) (1000 Units /25 mcg) tablet Take 1,000 Units by mouth daily. calcium 500 mg tab Take 500 mg by mouth daily. Indications: low amount of calcium in the blood      multivitamin (ONE A DAY) tablet Take 1 tablet by mouth daily. krill oil-omega-3-dha-epa 300-90 (27-45) mg cap Take 1 Tab by mouth daily. coenzyme q10-vitamin e 100-100 mg-unit cap Take 1 Tab by mouth daily.  (Patient not taking: Reported on 2022)       No Known Allergies    Family History   Problem Relation Age of Onset    Hypertension Mother     Heart Disease Mother     Alzheimer's Disease Mother     Hypertension Father     Hypertension Sister     Hypertension Brother     Alzheimer's Disease Maternal Aunt     Alzheimer's Disease Maternal Grandmother     Stroke Maternal Grandfather      Social History     Tobacco Use    Smoking status: Former     Years: 20.00     Types: Cigarettes     Quit date: 10/26/2004     Years since quittin.0 Smokeless tobacco: Never   Substance Use Topics    Alcohol use: Yes     Comment: rare, not monthly         Roselyn Tolbert MD

## 2022-11-07 NOTE — PROGRESS NOTES
1. \"Have you been to the ER, urgent care clinic since your last visit? Hospitalized since your last visit? \" No    2. \"Have you seen or consulted any other health care providers outside of the [address] [place] [address] since your last visit? \" No     3. For patients aged 39-70: Has the patient had a colonoscopy / FIT/ Cologuard? Yes - no Care Gap present      If the patient is female:    4. For patients aged 41-77: Has the patient had a mammogram within the past 2 years? Yes - no Care Gap present      5. For patients aged 21-65: Has the patient had a pap smear?  NA - based on age or sex

## 2022-11-07 NOTE — PATIENT INSTRUCTIONS

## 2022-11-07 NOTE — ACP (ADVANCE CARE PLANNING)
Advance Care Planning     General Advance Care Planning (ACP) Conversation      Date of Conversation: 11/7/2022  Conducted with: Patient with Decision Making Capacity    Healthcare Decision Maker:     Primary Decision Maker: Eva Childress - Yaya - [phone]  Click here to complete [address] including selection of the Healthcare Decision Maker Relationship (ie \"Primary\")        Content/Action Overview:   Has NO ACP documents/care preferences - information provided, considering goals and options  Reviewed DNR/DNI and patient elects Full Code (Attempt Resuscitation)         Length of Voluntary ACP Conversation in minutes:  <16 minutes (Non-Billable)    Mimi Celaya MD

## 2023-02-20 ENCOUNTER — TRANSCRIBE ORDER (OUTPATIENT)
Dept: SCHEDULING | Age: 73
End: 2023-02-20

## 2023-02-20 DIAGNOSIS — Z12.31 VISIT FOR SCREENING MAMMOGRAM: Primary | ICD-10-CM

## 2023-02-22 ENCOUNTER — HOSPITAL ENCOUNTER (OUTPATIENT)
Dept: MAMMOGRAPHY | Age: 73
Discharge: HOME | End: 2023-02-22
Attending: FAMILY MEDICINE
Payer: MEDICARE

## 2023-02-22 DIAGNOSIS — Z12.31 VISIT FOR SCREENING MAMMOGRAM: ICD-10-CM

## 2023-02-22 PROCEDURE — 77063 BREAST TOMOSYNTHESIS BI: CPT

## 2023-04-16 DIAGNOSIS — I10 HYPERTENSION, ISOLATED SYSTOLIC: ICD-10-CM

## 2023-04-18 RX ORDER — AMLODIPINE BESYLATE 2.5 MG/1
TABLET ORAL
Qty: 90 TABLET | Refills: 1 | Status: SHIPPED | OUTPATIENT
Start: 2023-04-18

## 2023-10-22 DIAGNOSIS — I10 ESSENTIAL (PRIMARY) HYPERTENSION: ICD-10-CM

## 2023-10-23 RX ORDER — AMLODIPINE BESYLATE 2.5 MG/1
TABLET ORAL
Qty: 90 TABLET | Refills: 1 | Status: SHIPPED | OUTPATIENT
Start: 2023-10-23

## 2024-04-21 DIAGNOSIS — I10 ESSENTIAL (PRIMARY) HYPERTENSION: ICD-10-CM

## 2024-04-26 RX ORDER — AMLODIPINE BESYLATE 2.5 MG/1
TABLET ORAL
Qty: 90 TABLET | Refills: 1 | Status: SHIPPED | OUTPATIENT
Start: 2024-04-26

## 2024-04-29 SDOH — ECONOMIC STABILITY: INCOME INSECURITY: HOW HARD IS IT FOR YOU TO PAY FOR THE VERY BASICS LIKE FOOD, HOUSING, MEDICAL CARE, AND HEATING?: NOT VERY HARD

## 2024-04-29 SDOH — ECONOMIC STABILITY: FOOD INSECURITY: WITHIN THE PAST 12 MONTHS, YOU WORRIED THAT YOUR FOOD WOULD RUN OUT BEFORE YOU GOT MONEY TO BUY MORE.: NEVER TRUE

## 2024-04-29 SDOH — ECONOMIC STABILITY: FOOD INSECURITY: WITHIN THE PAST 12 MONTHS, THE FOOD YOU BOUGHT JUST DIDN'T LAST AND YOU DIDN'T HAVE MONEY TO GET MORE.: NEVER TRUE

## 2024-04-29 SDOH — ECONOMIC STABILITY: TRANSPORTATION INSECURITY
IN THE PAST 12 MONTHS, HAS LACK OF TRANSPORTATION KEPT YOU FROM MEETINGS, WORK, OR FROM GETTING THINGS NEEDED FOR DAILY LIVING?: NO

## 2024-04-29 SDOH — ECONOMIC STABILITY: HOUSING INSECURITY
IN THE LAST 12 MONTHS, WAS THERE A TIME WHEN YOU DID NOT HAVE A STEADY PLACE TO SLEEP OR SLEPT IN A SHELTER (INCLUDING NOW)?: NO

## 2024-05-01 ENCOUNTER — OFFICE VISIT (OUTPATIENT)
Age: 74
End: 2024-05-01
Payer: MEDICARE

## 2024-05-01 VITALS
TEMPERATURE: 97.9 F | DIASTOLIC BLOOD PRESSURE: 64 MMHG | HEIGHT: 64 IN | RESPIRATION RATE: 18 BRPM | WEIGHT: 147.6 LBS | HEART RATE: 46 BPM | OXYGEN SATURATION: 100 % | BODY MASS INDEX: 25.2 KG/M2 | SYSTOLIC BLOOD PRESSURE: 158 MMHG

## 2024-05-01 DIAGNOSIS — Z12.4 CERVICAL CANCER SCREENING: ICD-10-CM

## 2024-05-01 DIAGNOSIS — Z12.31 BREAST CANCER SCREENING BY MAMMOGRAM: ICD-10-CM

## 2024-05-01 DIAGNOSIS — Z12.11 COLON CANCER SCREENING: ICD-10-CM

## 2024-05-01 DIAGNOSIS — Z78.0 POST-MENOPAUSAL: ICD-10-CM

## 2024-05-01 DIAGNOSIS — M85.80 OSTEOPENIA, UNSPECIFIED LOCATION: ICD-10-CM

## 2024-05-01 DIAGNOSIS — E78.2 MIXED HYPERLIPIDEMIA: ICD-10-CM

## 2024-05-01 DIAGNOSIS — I10 ESSENTIAL (PRIMARY) HYPERTENSION: Primary | ICD-10-CM

## 2024-05-01 DIAGNOSIS — R00.1 BRADYCARDIA: ICD-10-CM

## 2024-05-01 PROCEDURE — 1123F ACP DISCUSS/DSCN MKR DOCD: CPT | Performed by: FAMILY MEDICINE

## 2024-05-01 PROCEDURE — 3077F SYST BP >= 140 MM HG: CPT | Performed by: FAMILY MEDICINE

## 2024-05-01 PROCEDURE — 3078F DIAST BP <80 MM HG: CPT | Performed by: FAMILY MEDICINE

## 2024-05-01 PROCEDURE — 93005 ELECTROCARDIOGRAM TRACING: CPT | Performed by: FAMILY MEDICINE

## 2024-05-01 PROCEDURE — 93010 ELECTROCARDIOGRAM REPORT: CPT | Performed by: FAMILY MEDICINE

## 2024-05-01 PROCEDURE — 99214 OFFICE O/P EST MOD 30 MIN: CPT | Performed by: FAMILY MEDICINE

## 2024-05-01 ASSESSMENT — PATIENT HEALTH QUESTIONNAIRE - PHQ9
SUM OF ALL RESPONSES TO PHQ9 QUESTIONS 1 & 2: 0
2. FEELING DOWN, DEPRESSED OR HOPELESS: NOT AT ALL
SUM OF ALL RESPONSES TO PHQ QUESTIONS 1-9: 0
1. LITTLE INTEREST OR PLEASURE IN DOING THINGS: NOT AT ALL
SUM OF ALL RESPONSES TO PHQ QUESTIONS 1-9: 0

## 2024-05-01 NOTE — PROGRESS NOTES
\"Have you been to the ER, urgent care clinic since your last visit?  Hospitalized since your last visit?\"    NO    “Have you seen or consulted any other health care providers outside of [facility] since your last visit?”    NO            Click Here for Release of Records Request

## 2024-05-01 NOTE — PROGRESS NOTES
SUBJECTIVE:   Ms. Debbie Cox is a 73 y.o. female who is here for follow up of routine medical issues.      HTN: Her BP today is elevated at 145/66. She is on amlodipine 2.5 mg daily for HTN.     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 She denies any significant pain.     Memory: She feels like her memory has declined. She notes FMHx of Alzheimer's in her mother.     Health maintenance: She has been sleeping well at night and notes mood is good. She does not have a regular exercise regimen, but mows her lawn and takes her dog on walks.     PREVENTATIVE  Colonoscopy:   Gyn: cannot recall last pelvic exam  DEXA: 22  Mammogram: 23    At this time, she is otherwise doing well and has brought no other complaints to my attention today.  For a list of the medical issues addressed today, see the assessment and plan below.    PMH:   Past Medical History:   Diagnosis Date    Hypertension     Old MI (myocardial infarction)     old anteroseptal infarct per EKG saw cardio 2015 negative testing    Urinary incontinence      PSH:  has a past surgical history that includes Knee arthroscopy (Left); other surgical history; Bunionectomy (Right); and  section.    All: has No Known Allergies.   MEDS:   Current Outpatient Medications   Medication Sig    amLODIPine (NORVASC) 2.5 MG tablet TAKE 1 TABLET BY MOUTH EVERY DAY    vitamin D (CHOLECALCIFEROL) 25 MCG (1000 UT) TABS tablet Take by mouth daily     No current facility-administered medications for this visit.       FH: family history includes Alzheimer's Disease in her maternal aunt, maternal grandmother, and mother; Heart Disease in her mother; Hypertension in her brother, father, mother, and sister; Stroke in her maternal grandfather.   SH:  reports that she quit smoking about 19 years ago. Her smoking use included cigarettes. She has never used smokeless tobacco. She reports that she does not

## 2024-05-02 ENCOUNTER — OFFICE VISIT (OUTPATIENT)
Age: 74
End: 2024-05-02
Payer: MEDICARE

## 2024-05-02 VITALS
OXYGEN SATURATION: 98 % | WEIGHT: 148 LBS | DIASTOLIC BLOOD PRESSURE: 80 MMHG | HEART RATE: 55 BPM | HEIGHT: 64 IN | BODY MASS INDEX: 25.27 KG/M2 | SYSTOLIC BLOOD PRESSURE: 138 MMHG

## 2024-05-02 DIAGNOSIS — I10 BENIGN ESSENTIAL HTN: ICD-10-CM

## 2024-05-02 DIAGNOSIS — Z87.891 HISTORY OF TOBACCO USE: ICD-10-CM

## 2024-05-02 DIAGNOSIS — R00.1 BRADYCARDIA: Primary | ICD-10-CM

## 2024-05-02 LAB
ALBUMIN SERPL-MCNC: 3.6 G/DL (ref 3.5–5)
ALBUMIN/GLOB SERPL: 1.2 (ref 1.1–2.2)
ALP SERPL-CCNC: 92 U/L (ref 45–117)
ALT SERPL-CCNC: 19 U/L (ref 12–78)
ANION GAP SERPL CALC-SCNC: 6 MMOL/L (ref 5–15)
AST SERPL-CCNC: 21 U/L (ref 15–37)
BASOPHILS # BLD: 0 K/UL (ref 0–0.1)
BASOPHILS NFR BLD: 1 % (ref 0–1)
BILIRUB SERPL-MCNC: 0.3 MG/DL (ref 0.2–1)
BUN SERPL-MCNC: 10 MG/DL (ref 6–20)
BUN/CREAT SERPL: 11 (ref 12–20)
CALCIUM SERPL-MCNC: 9.7 MG/DL (ref 8.5–10.1)
CHLORIDE SERPL-SCNC: 110 MMOL/L (ref 97–108)
CHOLEST SERPL-MCNC: 210 MG/DL
CO2 SERPL-SCNC: 27 MMOL/L (ref 21–32)
CREAT SERPL-MCNC: 0.92 MG/DL (ref 0.55–1.02)
DIFFERENTIAL METHOD BLD: ABNORMAL
EOSINOPHIL # BLD: 0 K/UL (ref 0–0.4)
EOSINOPHIL NFR BLD: 1 % (ref 0–7)
ERYTHROCYTE [DISTWIDTH] IN BLOOD BY AUTOMATED COUNT: 13.1 % (ref 11.5–14.5)
GLOBULIN SER CALC-MCNC: 2.9 G/DL (ref 2–4)
GLUCOSE SERPL-MCNC: 85 MG/DL (ref 65–100)
HCT VFR BLD AUTO: 36.4 % (ref 35–47)
HDLC SERPL-MCNC: 97 MG/DL
HDLC SERPL: 2.2 (ref 0–5)
HGB BLD-MCNC: 11.5 G/DL (ref 11.5–16)
IMM GRANULOCYTES # BLD AUTO: 0 K/UL (ref 0–0.04)
IMM GRANULOCYTES NFR BLD AUTO: 0 % (ref 0–0.5)
LDLC SERPL CALC-MCNC: 100.8 MG/DL (ref 0–100)
LYMPHOCYTES # BLD: 1.3 K/UL (ref 0.8–3.5)
LYMPHOCYTES NFR BLD: 37 % (ref 12–49)
MCH RBC QN AUTO: 29.6 PG (ref 26–34)
MCHC RBC AUTO-ENTMCNC: 31.6 G/DL (ref 30–36.5)
MCV RBC AUTO: 93.6 FL (ref 80–99)
MONOCYTES # BLD: 0.3 K/UL (ref 0–1)
MONOCYTES NFR BLD: 9 % (ref 5–13)
NEUTS SEG # BLD: 1.8 K/UL (ref 1.8–8)
NEUTS SEG NFR BLD: 52 % (ref 32–75)
NRBC # BLD: 0 K/UL (ref 0–0.01)
NRBC BLD-RTO: 0 PER 100 WBC
PLATELET # BLD AUTO: 315 K/UL (ref 150–400)
PMV BLD AUTO: 11.4 FL (ref 8.9–12.9)
POTASSIUM SERPL-SCNC: 4.2 MMOL/L (ref 3.5–5.1)
PROT SERPL-MCNC: 6.5 G/DL (ref 6.4–8.2)
RBC # BLD AUTO: 3.89 M/UL (ref 3.8–5.2)
SODIUM SERPL-SCNC: 143 MMOL/L (ref 136–145)
TRIGL SERPL-MCNC: 61 MG/DL
VLDLC SERPL CALC-MCNC: 12.2 MG/DL
WBC # BLD AUTO: 3.4 K/UL (ref 3.6–11)

## 2024-05-02 PROCEDURE — 3079F DIAST BP 80-89 MM HG: CPT | Performed by: INTERNAL MEDICINE

## 2024-05-02 PROCEDURE — 3075F SYST BP GE 130 - 139MM HG: CPT | Performed by: INTERNAL MEDICINE

## 2024-05-02 PROCEDURE — 1123F ACP DISCUSS/DSCN MKR DOCD: CPT | Performed by: INTERNAL MEDICINE

## 2024-05-02 PROCEDURE — 99203 OFFICE O/P NEW LOW 30 MIN: CPT | Performed by: INTERNAL MEDICINE

## 2024-05-02 ASSESSMENT — PATIENT HEALTH QUESTIONNAIRE - PHQ9
SUM OF ALL RESPONSES TO PHQ QUESTIONS 1-9: 0
SUM OF ALL RESPONSES TO PHQ QUESTIONS 1-9: 0
1. LITTLE INTEREST OR PLEASURE IN DOING THINGS: NOT AT ALL
2. FEELING DOWN, DEPRESSED OR HOPELESS: NOT AT ALL
SUM OF ALL RESPONSES TO PHQ9 QUESTIONS 1 & 2: 0
SUM OF ALL RESPONSES TO PHQ QUESTIONS 1-9: 0
SUM OF ALL RESPONSES TO PHQ QUESTIONS 1-9: 0

## 2024-05-02 NOTE — PROGRESS NOTES
[facility] [address]: [place]  [phone]    History of Present Illness:  Ms. Cox is a 74 yo F with history of tobacco use quit a few years ago, essential hypertension, referred by her primary care for cardiac evaluation Dr. Dumont.  She was not sure why she was referred here, however when they evaluated her the other day, she did have abnormal echocardiogram.  From a symptom standpoint she denies any lightheadedness, dizziness.  No syncope.  No exertional chest pain.  Her breathing has been stable. Her EKG yesterday reviewed personally showed sinus bradycardia, heart rate of 45, nonspecific ST-T wave abnormality. She is compensated on exam with clear lungs and no lower extremity edema.   Soc hx. Quit tobacco few years  Fam hx. Mom with CAD  Assessment and Plan:   1. Sinus bradycardia.  She is asymptomatic with this.  Most consistent with a normal variant.  If in the future she has symptoms, would consider a heart monitor and we talked about various symptoms to be on the lookout for.  She can follow here as needed.  2. Essential hypertension.  Blood pressure controlled and no changes made.  3. Tobacco use.  Quit a few years ago.        She  has a past medical history of Hypertension, Old MI (myocardial infarction), and Urinary incontinence.    All other systems negative except as above.     PE  Vitals:    05/02/24 0957   BP: 138/80   Site: Left Upper Arm   Position: Sitting   Cuff Size: Medium Adult   Pulse: 55   SpO2: 98%   Weight: 67.1 kg (148 lb)   Height: 1.626 m (5' 4\")    Body mass index is 25.4 kg/m².  General appearance - alert, well appearing, and in no distress  Mental status - affect appropriate to mood  Eyes - sclera anicteric, moist mucous membranes  Neck - supple, no JVD  Chest - clear to auscultation, no wheezes, rales or rhonchi  Heart - normal rate, regular rhythm, normal S1, S2, no murmurs, rubs, clicks or gallops  Abdomen - soft, nontender, nondistended, no masses or organomegaly  Extremities -

## 2024-05-31 ENCOUNTER — HOSPITAL ENCOUNTER (OUTPATIENT)
Facility: HOSPITAL | Age: 74
End: 2024-05-31
Attending: FAMILY MEDICINE
Payer: MEDICARE

## 2024-05-31 VITALS — WEIGHT: 148 LBS | HEIGHT: 64 IN | BODY MASS INDEX: 25.27 KG/M2

## 2024-05-31 DIAGNOSIS — Z12.31 BREAST CANCER SCREENING BY MAMMOGRAM: ICD-10-CM

## 2024-05-31 DIAGNOSIS — Z78.0 POST-MENOPAUSAL: ICD-10-CM

## 2024-05-31 DIAGNOSIS — M85.80 OSTEOPENIA, UNSPECIFIED LOCATION: ICD-10-CM

## 2024-05-31 PROCEDURE — 77080 DXA BONE DENSITY AXIAL: CPT

## 2024-05-31 PROCEDURE — 77063 BREAST TOMOSYNTHESIS BI: CPT

## 2024-06-04 ENCOUNTER — TELEPHONE (OUTPATIENT)
Age: 74
End: 2024-06-04

## 2024-06-04 NOTE — TELEPHONE ENCOUNTER
----- Message from Sallie Dumont MD sent at 6/3/2024 12:44 AM EDT -----  Please schedule a virtual or office appointment for this patient to discuss her DEXA scan results and treatment.

## 2024-06-10 ENCOUNTER — OFFICE VISIT (OUTPATIENT)
Age: 74
End: 2024-06-10
Payer: MEDICARE

## 2024-06-10 VITALS
BODY MASS INDEX: 25.27 KG/M2 | DIASTOLIC BLOOD PRESSURE: 64 MMHG | SYSTOLIC BLOOD PRESSURE: 129 MMHG | HEART RATE: 52 BPM | RESPIRATION RATE: 18 BRPM | OXYGEN SATURATION: 99 % | TEMPERATURE: 97.8 F | HEIGHT: 64 IN | WEIGHT: 148 LBS

## 2024-06-10 DIAGNOSIS — M81.0 AGE-RELATED OSTEOPOROSIS WITHOUT CURRENT PATHOLOGICAL FRACTURE: Primary | ICD-10-CM

## 2024-06-10 PROCEDURE — 99214 OFFICE O/P EST MOD 30 MIN: CPT | Performed by: FAMILY MEDICINE

## 2024-06-10 PROCEDURE — 1123F ACP DISCUSS/DSCN MKR DOCD: CPT | Performed by: FAMILY MEDICINE

## 2024-06-10 RX ORDER — IBANDRONATE SODIUM 150 MG/1
150 TABLET, FILM COATED ORAL
Qty: 6 TABLET | Refills: 1 | Status: SHIPPED | OUTPATIENT
Start: 2024-06-10

## 2024-06-10 RX ORDER — M-VIT,TX,IRON,MINS/CALC/FOLIC 27MG-0.4MG
1 TABLET ORAL DAILY
COMMUNITY

## 2024-06-10 NOTE — PROGRESS NOTES
\"Have you been to the ER, urgent care clinic since your last visit?  Hospitalized since your last visit?\"    NO    “Have you seen or consulted any other health care providers outside of [facility] since your last visit?”    NO            Click Here for Release of Records Request  
Patient DEXA scan showed Osteopenia in the lumbar spine. Pt most recent DEXA Scan shows there has been a slight decrease in the left hip into the osteopenic range  We discussed options to treat osteoporosis either oral or injections.  She does not want to use any infusions or injections.  She prefers to take the oral medication.  We also discussed the hereditary factors in osteoporosis.   I prescribed the patient ibandronate 150 mg.  -     ibandronate (BONIVA) 150 MG tablet; Take 1 tablet by mouth every 30 days Take one (1) tablet once per month in the morning with a full glass of water, on an empty stomach, and do not take anything else by mouth or lie down for the next 60 minutes.  Repeat blood pressure prior to discharge from this appointment was 129/64.    I have reviewed the patient's medications and risks/side effects/benefits were discussed. Diagnosis(-es) explained to patient and questions answered. Literature provided where appropriate.     IMercy, am scribing for and in the presence of Sallie Dumont MD. 6/10/24/8:55 AM EDT    I have reviewed the note documented by the scribe.  The services provided are my own.  The documentation is accurate. Sallie Dumont MD

## 2024-10-07 ENCOUNTER — OFFICE VISIT (OUTPATIENT)
Age: 74
End: 2024-10-07
Payer: MEDICARE

## 2024-10-07 VITALS
HEART RATE: 50 BPM | DIASTOLIC BLOOD PRESSURE: 60 MMHG | BODY MASS INDEX: 24.45 KG/M2 | OXYGEN SATURATION: 100 % | HEIGHT: 64 IN | RESPIRATION RATE: 18 BRPM | SYSTOLIC BLOOD PRESSURE: 135 MMHG | TEMPERATURE: 98 F | WEIGHT: 143.2 LBS

## 2024-10-07 DIAGNOSIS — K21.9 GASTROESOPHAGEAL REFLUX DISEASE WITHOUT ESOPHAGITIS: ICD-10-CM

## 2024-10-07 DIAGNOSIS — R41.3 MEMORY CHANGES: ICD-10-CM

## 2024-10-07 DIAGNOSIS — I10 ESSENTIAL (PRIMARY) HYPERTENSION: Primary | ICD-10-CM

## 2024-10-07 DIAGNOSIS — Z23 NEEDS FLU SHOT: ICD-10-CM

## 2024-10-07 PROCEDURE — 99214 OFFICE O/P EST MOD 30 MIN: CPT | Performed by: FAMILY MEDICINE

## 2024-10-07 PROCEDURE — 3078F DIAST BP <80 MM HG: CPT | Performed by: FAMILY MEDICINE

## 2024-10-07 PROCEDURE — 90653 IIV ADJUVANT VACCINE IM: CPT | Performed by: FAMILY MEDICINE

## 2024-10-07 PROCEDURE — 1123F ACP DISCUSS/DSCN MKR DOCD: CPT | Performed by: FAMILY MEDICINE

## 2024-10-07 PROCEDURE — 3075F SYST BP GE 130 - 139MM HG: CPT | Performed by: FAMILY MEDICINE

## 2024-10-07 PROCEDURE — PBSHW INFLUENZA, FLUAD TRIVALENT, (AGE 65 Y+), IM, PRESERVATIVE FREE, 0.5ML: Performed by: FAMILY MEDICINE

## 2024-10-07 RX ORDER — PANTOPRAZOLE SODIUM 40 MG/1
40 TABLET, DELAYED RELEASE ORAL
Qty: 30 TABLET | Refills: 0 | Status: SHIPPED | OUTPATIENT
Start: 2024-10-07

## 2024-10-07 ASSESSMENT — PATIENT HEALTH QUESTIONNAIRE - PHQ9
SUM OF ALL RESPONSES TO PHQ QUESTIONS 1-9: 0
1. LITTLE INTEREST OR PLEASURE IN DOING THINGS: NOT AT ALL
SUM OF ALL RESPONSES TO PHQ QUESTIONS 1-9: 0
SUM OF ALL RESPONSES TO PHQ9 QUESTIONS 1 & 2: 0
2. FEELING DOWN, DEPRESSED OR HOPELESS: NOT AT ALL

## 2024-10-07 NOTE — PROGRESS NOTES
\"Have you been to the ER, urgent care clinic since your last visit?  Hospitalized since your last visit?\"    NO    “Have you seen or consulted any other health care providers outside our system since your last visit?”    NO           
oz)   SpO2 100%   BMI 24.58 kg/m²    Gen: Pleasant 74 y.o.  female in NAD.    HEENT: PERRLA. EOMI. OP moist and pink.    Neck: Supple.  No LAD.   HEART: RRR, No M/G/R.      LUNGS: CTAB No W/R.    ABDOMEN: S, NT, ND, BS+.   Normal no epigastric tenderness.  EXTREMITIES: Warm. No C/C/E.    MUSCULOSKELETAL: Normal ROM, muscle strength 5/5 all groups.    NEURO: Alert and oriented x 3.  Cranial nerves grossly intact.  No focal sensory or motor deficits noted.   SKIN: Warm. Dry. No rashes or other lesions noted.    ASSESSMENT/ PLAN: Debbie was seen today for follow-up.    Diagnoses and all orders for this visit:    Essential (primary) hypertension  BP is controlled. Pt should continue with current regimen.    Needs flu shot  Flu shot administered in office per pt request.  -     Influenza, FLUAD Trivalent, (age 65 y+), IM, Preservative Free, 0.5mL    Memory changes  We discussed the importance of learning something new for memory. I referred the pt to Dr. Ashutosh Palacio (neurology) and Scott Guajardo (neuropsychology). I also referred the pt to external neuropsychology.  -     [facility] - Scott Guajardo PsyD, Neuropsychology, [place]  -     [facility] - Ashutosh Palacio MD, Neurology, [place]  -     External Referral To Neuropsychology    Other orders  I advised the pt the medication for treating her osteoporosis all have the same side effects of GI problems. We will consider other treatment if her GI symptoms persist despite using the Protonix. I  advised the pt the pepto bismol may be the cause of the black stool.  I suggested the pt to begin taking Protonix  to alleviate the unsettling stomach discomfort. I prescribed the Protonix 40 mg daily pt for the next 3 weeks.We will continue to monitor symptoms. I advised the pt to continue doing weight bearing and resistance training to help bone turnover.  -     pantoprazole (PROTONIX) 40 MG tablet; Take 1 tablet by mouth every morning (before breakfast)      I have reviewed the

## 2024-10-10 ENCOUNTER — TELEPHONE (OUTPATIENT)
Age: 74
End: 2024-10-10

## 2024-10-10 NOTE — TELEPHONE ENCOUNTER
Neuropsychological is asking for notes about pt. Last vist.. Could you please sign the note. Thank you.

## 2024-10-11 ENCOUNTER — TELEPHONE (OUTPATIENT)
Age: 74
End: 2024-10-11

## 2024-10-11 NOTE — TELEPHONE ENCOUNTER
HIPAA Verified: yes     Reason for Call:   Requesting New Patient Appt    Is this a New Patient Appointment Request?   yes    If yes:   Requested Provider:   Casandra    Referred By:  Dr. Sallie Dumont    Referral in chart?   yes    Patient's Insurance Carrier:   [facility] Medicare    Additional notes / reason for call:   Pt's daughter, Gosia, called to schedule appts for Pt. Advised our  is out and she will receive a call next week when she is back. Gosia expressed understanding. [phone]    (She also was confused- thought Dr. Ashutosh Palacio was a neuropsychologist as well. I explained that he is a Neurologist and I explained the difference to her. She said she thinks her PCP just wants her to have cognitive testing done. I advised that Pt's pcp put in a referral for both Neurology and Neuropsychology so she should give the PCP's office a call for clarity. Gosia expressed understanding.)

## 2024-10-14 ENCOUNTER — TELEPHONE (OUTPATIENT)
Age: 74
End: 2024-10-14

## 2024-10-14 DIAGNOSIS — R41.3 MEMORY CHANGES: Primary | ICD-10-CM

## 2024-10-14 NOTE — TELEPHONE ENCOUNTER
Unable to get into original referred specialist,  Needs referral to new specialist.      The caller is requesting a referral ORDER AND INSURANCE REFERRAL for:    Provider:    [facility]  leon Bal   Address:  [place] [address]  [place] [address]  Phone:  [phone]  Fax:   [phone]  Specialty:   neuropsycology  Appt date:  10/28/2024   Diagnosis:  Memory changes      States please fax:  Demographics  Office notes  Recent lab work/scans  Referral order/insurance referral      Visit / Appointment History:  Future Appointment:  1/6/2025   Last Appointment With Me:  10/7/2024

## 2024-10-22 DIAGNOSIS — I10 ESSENTIAL (PRIMARY) HYPERTENSION: ICD-10-CM

## 2024-10-22 RX ORDER — AMLODIPINE BESYLATE 2.5 MG/1
TABLET ORAL
Qty: 90 TABLET | Refills: 1 | Status: SHIPPED | OUTPATIENT
Start: 2024-10-22

## 2024-10-23 NOTE — TELEPHONE ENCOUNTER
Demographics, ov notes, labs and referral has been faxed and called pt to let her know no answer LM

## 2024-10-31 DIAGNOSIS — K21.9 GASTROESOPHAGEAL REFLUX DISEASE WITHOUT ESOPHAGITIS: ICD-10-CM

## 2024-11-01 RX ORDER — PANTOPRAZOLE SODIUM 40 MG/1
40 TABLET, DELAYED RELEASE ORAL
Qty: 90 TABLET | Refills: 1 | Status: SHIPPED | OUTPATIENT
Start: 2024-11-01

## 2024-11-04 ENCOUNTER — TELEPHONE (OUTPATIENT)
Age: 74
End: 2024-11-04

## 2024-11-04 DIAGNOSIS — R41.3 MEMORY LOSS OF UNKNOWN CAUSE: Primary | ICD-10-CM

## 2024-11-04 NOTE — TELEPHONE ENCOUNTER
Patient's daughter, Gosia, states she needs a call back to discuss getting an order for Imaging/PET Scan for patient with Memory concerns.     Gosia also needs to discuss getting help with Neurologist appt sooner.       Please call to discuss & advise. Thank you

## 2024-11-08 NOTE — TELEPHONE ENCOUNTER
Spoke with daughter states patient saw Dr. Tanisha Ashford, neuropsychologist with [facility], Fax [phone]     Who is recommending patient's PCP order imaging due to memory concerns, daughter specifically is requesting a PET scan to see if amloid protein is present.     Daughter is also wanting office to see if they can get patient in sooner with neurology here is [place] than her May 2025 appt.    Advised daughter that getting the records from Dr. Ashford would be helpful for PCP to review.    Records requested

## 2024-11-13 ENCOUNTER — TELEPHONE (OUTPATIENT)
Age: 74
End: 2024-11-13

## 2024-11-13 NOTE — TELEPHONE ENCOUNTER
Patient's daughter called to ask about the imaging referral that they received. Patient's daughter states that the patient does not have an appointment until December and wants to know if there is another place they can go.    Please call patient/patient's daughter back to advise

## 2024-11-13 NOTE — TELEPHONE ENCOUNTER
Daughter Gosia would like to know if you received results of neuro testing?   This would have come from another facility.  [place], [place]     She spoke to the doctor there and was told that this was faxed to our office.  Did you receive?    Please call Gosia to let her know.

## 2024-11-15 NOTE — TELEPHONE ENCOUNTER
I spoke to the patient's daughter, I advised her she can go to any imaging in or outside of [place]. I gave her the phone # to [facility]. Dr. Dumont can give her another referral if she needs it.    While I was on the phone, she wanted to know if her mom could get in sooner with a neurologist then next year. I explained to her, she can place her mom's name on the cancellation list and/or see any neurologist in the office.    Dr. Dumont can give her an external referral if she would like. Lastly, she wanted to know if we received the results from her mom's neuro test. I told her we have not. I gave her a fax # and she can put att:  or Rebecca.     Patient's daughter verbalized understanding of information discussed w/ no further questions at this time.

## 2024-12-03 ENCOUNTER — TELEPHONE (OUTPATIENT)
Age: 74
End: 2024-12-03

## 2024-12-03 DIAGNOSIS — R41.3 MEMORY CHANGES: ICD-10-CM

## 2024-12-03 NOTE — TELEPHONE ENCOUNTER
Daughter, Gosia sent a My Chart message on 11-18-24 and has gotten no response as of yet.  Why she ask?    This pertains to results and medication.      Please call Gosia to discuss all.

## 2024-12-04 RX ORDER — DONEPEZIL HYDROCHLORIDE 5 MG/1
5 TABLET, FILM COATED ORAL NIGHTLY
Qty: 90 TABLET | Refills: 1 | Status: SHIPPED | OUTPATIENT
Start: 2024-12-04

## 2024-12-06 NOTE — TELEPHONE ENCOUNTER
Patient's daughter notified of RX being sent and states the patient has an appt in [place] on   1/8/2025 Dr. Palacio     Daughter states patient took Aricept 20 years ago and it was not effect.     Daughter wants to know if there is another option as the neuropsychologist recommends taking both Aricept and Namenda.    She is asking of records can be sent to [facility] to see if they can get her in sooner.     Records sent to Dr. Ashutosh Palacio to request consideration for earlier appt.

## 2024-12-11 ENCOUNTER — HOSPITAL ENCOUNTER (OUTPATIENT)
Facility: HOSPITAL | Age: 74
Discharge: HOME | End: 2024-12-14
Attending: FAMILY MEDICINE
Payer: MEDICARE

## 2024-12-11 DIAGNOSIS — R41.3 MEMORY LOSS OF UNKNOWN CAUSE: ICD-10-CM

## 2024-12-11 PROCEDURE — A9586 FLORBETAPIR F18: HCPCS | Performed by: FAMILY MEDICINE

## 2024-12-11 PROCEDURE — 6360000002 HC RX W HCPCS: Performed by: FAMILY MEDICINE

## 2024-12-11 PROCEDURE — 78814 PET IMAGE W/CT LMTD: CPT

## 2024-12-11 RX ADMIN — FLORBETAPIR F 18 10 MILLICURIE: 51 INJECTION, SOLUTION INTRAVENOUS at 14:00

## 2024-12-14 ENCOUNTER — TELEPHONE (OUTPATIENT)
Age: 74
End: 2024-12-14

## 2024-12-14 DIAGNOSIS — F03.B0 MODERATE DEMENTIA WITHOUT BEHAVIORAL DISTURBANCE, PSYCHOTIC DISTURBANCE, MOOD DISTURBANCE, OR ANXIETY, UNSPECIFIED DEMENTIA TYPE (HCC): Primary | ICD-10-CM

## 2024-12-14 RX ORDER — MEMANTINE HYDROCHLORIDE 5 MG/1
5 TABLET ORAL 2 TIMES DAILY
Qty: 180 TABLET | Refills: 1 | Status: SHIPPED | OUTPATIENT
Start: 2024-12-14

## 2025-01-13 ENCOUNTER — OFFICE VISIT (OUTPATIENT)
Age: 75
End: 2025-01-13
Payer: MEDICARE

## 2025-01-13 VITALS
HEIGHT: 64 IN | DIASTOLIC BLOOD PRESSURE: 86 MMHG | HEART RATE: 53 BPM | TEMPERATURE: 98.4 F | RESPIRATION RATE: 15 BRPM | OXYGEN SATURATION: 99 % | SYSTOLIC BLOOD PRESSURE: 136 MMHG | WEIGHT: 149.6 LBS | BODY MASS INDEX: 25.54 KG/M2

## 2025-01-13 DIAGNOSIS — E03.4 HYPOTHYROIDISM DUE TO ACQUIRED ATROPHY OF THYROID: ICD-10-CM

## 2025-01-13 DIAGNOSIS — R41.3 DISTURBANCE OF MEMORY: ICD-10-CM

## 2025-01-13 DIAGNOSIS — G31.09 FRONTO-TEMPORAL DEMENTIA (HCC): ICD-10-CM

## 2025-01-13 DIAGNOSIS — E53.8 B12 DEFICIENCY: ICD-10-CM

## 2025-01-13 DIAGNOSIS — E55.9 VITAMIN D DEFICIENCY: Primary | ICD-10-CM

## 2025-01-13 DIAGNOSIS — F02.80 FRONTO-TEMPORAL DEMENTIA (HCC): ICD-10-CM

## 2025-01-13 DIAGNOSIS — I65.23 BILATERAL CAROTID ARTERY STENOSIS: ICD-10-CM

## 2025-01-13 DIAGNOSIS — I67.89 CEREBRAL MICROVASCULAR DISEASE: ICD-10-CM

## 2025-01-13 PROCEDURE — 1090F PRES/ABSN URINE INCON ASSESS: CPT | Performed by: PSYCHIATRY & NEUROLOGY

## 2025-01-13 PROCEDURE — 1123F ACP DISCUSS/DSCN MKR DOCD: CPT | Performed by: PSYCHIATRY & NEUROLOGY

## 2025-01-13 PROCEDURE — 1126F AMNT PAIN NOTED NONE PRSNT: CPT | Performed by: PSYCHIATRY & NEUROLOGY

## 2025-01-13 PROCEDURE — G8419 CALC BMI OUT NRM PARAM NOF/U: HCPCS | Performed by: PSYCHIATRY & NEUROLOGY

## 2025-01-13 PROCEDURE — 99205 OFFICE O/P NEW HI 60 MIN: CPT | Performed by: PSYCHIATRY & NEUROLOGY

## 2025-01-13 PROCEDURE — G8427 DOCREV CUR MEDS BY ELIG CLIN: HCPCS | Performed by: PSYCHIATRY & NEUROLOGY

## 2025-01-13 PROCEDURE — G8399 PT W/DXA RESULTS DOCUMENT: HCPCS | Performed by: PSYCHIATRY & NEUROLOGY

## 2025-01-13 PROCEDURE — 1159F MED LIST DOCD IN RCRD: CPT | Performed by: PSYCHIATRY & NEUROLOGY

## 2025-01-13 PROCEDURE — 1036F TOBACCO NON-USER: CPT | Performed by: PSYCHIATRY & NEUROLOGY

## 2025-01-13 PROCEDURE — 1160F RVW MEDS BY RX/DR IN RCRD: CPT | Performed by: PSYCHIATRY & NEUROLOGY

## 2025-01-13 RX ORDER — DONEPEZIL HYDROCHLORIDE 10 MG/1
10 TABLET, FILM COATED ORAL
Qty: 30 TABLET | Refills: 3 | Status: SHIPPED | OUTPATIENT
Start: 2025-01-13

## 2025-01-13 ASSESSMENT — PATIENT HEALTH QUESTIONNAIRE - PHQ9
2. FEELING DOWN, DEPRESSED OR HOPELESS: NOT AT ALL
SUM OF ALL RESPONSES TO PHQ QUESTIONS 1-9: 0
1. LITTLE INTEREST OR PLEASURE IN DOING THINGS: NOT AT ALL
SUM OF ALL RESPONSES TO PHQ QUESTIONS 1-9: 0
SUM OF ALL RESPONSES TO PHQ9 QUESTIONS 1 & 2: 0

## 2025-01-14 NOTE — PROGRESS NOTES
going over this in great detail.      Signed By: Ashutosh Palacio MD     January 13, 2025       CC: Sallie Dumont MD  FAX: [phone]

## 2025-01-15 LAB
FOLATE SERPL-MCNC: 32.2 NG/ML (ref 5–21)
TSH SERPL DL<=0.05 MIU/L-ACNC: 1.5 UIU/ML (ref 0.36–3.74)
VIT B12 SERPL-MCNC: 900 PG/ML (ref 193–986)

## 2025-01-16 LAB — 25(OH)D3 SERPL-MCNC: 43.1 NG/ML (ref 30–100)

## 2025-01-20 ENCOUNTER — CLINICAL DOCUMENTATION (OUTPATIENT)
Age: 75
End: 2025-01-20

## 2025-01-20 LAB
LABORATORY COMMENT REPORT: ABNORMAL
P-TAU217: 0.53 PG/ML (ref 0–0.18)

## 2025-01-20 NOTE — PROGRESS NOTES
Patient does have elevated phosphorylated tau level suggesting an Alzheimer's type dementia, but it appears that she is in the moderate range and not a candidate for monoclonal antibodies against amyloid.

## 2025-02-04 DIAGNOSIS — K21.9 GASTROESOPHAGEAL REFLUX DISEASE WITHOUT ESOPHAGITIS: ICD-10-CM

## 2025-02-07 NOTE — TELEPHONE ENCOUNTER
[facility] - Periop Services  Discharge Note  Short Stay    Procedure(s) (LRB):  FUSION, JOINT, MIDFOOT  / 1st MPJ Right (Right)      OUTCOME: Patient tolerated treatment/procedure well without complication and is now ready for discharge.    DISPOSITION: Home or Self Care    FINAL DIAGNOSIS:  <principal problem not specified>    FOLLOWUP: In clinic    DISCHARGE INSTRUCTIONS:    Discharge Procedure Orders   Diet Adult Regular     Keep surgical extremity elevated     Leave dressing on - Keep it clean, dry, and intact until clinic visit     Activity as tolerated        TIME SPENT ON DISCHARGE: 25   minutes   Schedule 4/25/24

## 2025-02-09 RX ORDER — PANTOPRAZOLE SODIUM 40 MG/1
40 TABLET, DELAYED RELEASE ORAL
Qty: 90 TABLET | Refills: 1 | Status: SHIPPED | OUTPATIENT
Start: 2025-02-09

## 2025-03-14 ENCOUNTER — TELEPHONE (OUTPATIENT)
Age: 75
End: 2025-03-14

## 2025-03-14 NOTE — TELEPHONE ENCOUNTER
Patient called in stating she has made several appts with Neurology (you can see in epic) and she is also following up with  which is her neurologist for her memory loss so she is asking  if she still is required to keep her upcomming appts with ? Please call to advise

## 2025-03-19 NOTE — TELEPHONE ENCOUNTER
Called patient to inform her that she can just see Dr. Muir. [facility] for patient.

## 2025-03-20 ENCOUNTER — TELEPHONE (OUTPATIENT)
Age: 75
End: 2025-03-20

## 2025-03-20 NOTE — TELEPHONE ENCOUNTER
Caller States:  Symptoms/concern:   Post nasal drip  Onset:   About a month, when last med added   What has been tried:   Nothing yet , wants to check with pcp first      Appointments:  Appointment offers during call:  Declined next day vv    Date of last appointment:    10/7/2024         Request:  Advice on over the counter medication that can be taken alongside current rx meds  [phone]        Caller confirms readback of documented phone/fax number(s) as correct.    Caller advised that there can be a 24-48 business hour turn around time on callbacks.    Caller advised that if pt deems they cannot wait any longer or symptoms worsen, ED or UC would be another option to consider for immediate treatment.

## 2025-04-14 DIAGNOSIS — I67.89 CEREBRAL MICROVASCULAR DISEASE: ICD-10-CM

## 2025-04-14 DIAGNOSIS — F02.80 FRONTO-TEMPORAL DEMENTIA (HCC): ICD-10-CM

## 2025-04-14 DIAGNOSIS — R41.3 DISTURBANCE OF MEMORY: ICD-10-CM

## 2025-04-14 DIAGNOSIS — G31.09 FRONTO-TEMPORAL DEMENTIA (HCC): ICD-10-CM

## 2025-04-15 RX ORDER — DONEPEZIL HYDROCHLORIDE 10 MG/1
10 TABLET, FILM COATED ORAL
Qty: 90 TABLET | Refills: 1 | Status: SHIPPED | OUTPATIENT
Start: 2025-04-15

## 2025-04-16 DIAGNOSIS — I10 ESSENTIAL (PRIMARY) HYPERTENSION: ICD-10-CM

## 2025-04-20 RX ORDER — AMLODIPINE BESYLATE 2.5 MG/1
2.5 TABLET ORAL DAILY
Qty: 90 TABLET | Refills: 3 | Status: SHIPPED | OUTPATIENT
Start: 2025-04-20

## 2025-05-13 SDOH — ECONOMIC STABILITY: FOOD INSECURITY: WITHIN THE PAST 12 MONTHS, THE FOOD YOU BOUGHT JUST DIDN'T LAST AND YOU DIDN'T HAVE MONEY TO GET MORE.: NEVER TRUE

## 2025-05-13 SDOH — ECONOMIC STABILITY: INCOME INSECURITY: IN THE LAST 12 MONTHS, WAS THERE A TIME WHEN YOU WERE NOT ABLE TO PAY THE MORTGAGE OR RENT ON TIME?: PATIENT DECLINED

## 2025-05-13 SDOH — ECONOMIC STABILITY: FOOD INSECURITY: WITHIN THE PAST 12 MONTHS, YOU WORRIED THAT YOUR FOOD WOULD RUN OUT BEFORE YOU GOT MONEY TO BUY MORE.: NEVER TRUE

## 2025-05-13 SDOH — ECONOMIC STABILITY: TRANSPORTATION INSECURITY
IN THE PAST 12 MONTHS, HAS THE LACK OF TRANSPORTATION KEPT YOU FROM MEDICAL APPOINTMENTS OR FROM GETTING MEDICATIONS?: NO

## 2025-05-15 ENCOUNTER — OFFICE VISIT (OUTPATIENT)
Age: 75
End: 2025-05-15
Payer: MEDICARE

## 2025-05-15 VITALS
SYSTOLIC BLOOD PRESSURE: 152 MMHG | TEMPERATURE: 98 F | WEIGHT: 146.4 LBS | DIASTOLIC BLOOD PRESSURE: 56 MMHG | HEIGHT: 64 IN | RESPIRATION RATE: 18 BRPM | OXYGEN SATURATION: 99 % | BODY MASS INDEX: 25 KG/M2 | HEART RATE: 54 BPM

## 2025-05-15 DIAGNOSIS — I10 ESSENTIAL (PRIMARY) HYPERTENSION: Primary | ICD-10-CM

## 2025-05-15 DIAGNOSIS — R41.3 DISTURBANCE OF MEMORY: ICD-10-CM

## 2025-05-15 LAB
ALBUMIN SERPL-MCNC: 3.8 G/DL (ref 3.5–5)
ALBUMIN/GLOB SERPL: 1.4 (ref 1.1–2.2)
ALP SERPL-CCNC: 84 U/L (ref 45–117)
ALT SERPL-CCNC: 23 U/L (ref 12–78)
ANION GAP SERPL CALC-SCNC: 4 MMOL/L (ref 2–12)
AST SERPL-CCNC: 19 U/L (ref 15–37)
BASOPHILS # BLD: 0.04 K/UL (ref 0–0.1)
BASOPHILS NFR BLD: 1 % (ref 0–1)
BILIRUB SERPL-MCNC: 0.4 MG/DL (ref 0.2–1)
BUN SERPL-MCNC: 14 MG/DL (ref 6–20)
BUN/CREAT SERPL: 15 (ref 12–20)
CALCIUM SERPL-MCNC: 9.9 MG/DL (ref 8.5–10.1)
CHLORIDE SERPL-SCNC: 108 MMOL/L (ref 97–108)
CO2 SERPL-SCNC: 29 MMOL/L (ref 21–32)
CREAT SERPL-MCNC: 0.93 MG/DL (ref 0.55–1.02)
DIFFERENTIAL METHOD BLD: ABNORMAL
EOSINOPHIL # BLD: 0.03 K/UL (ref 0–0.4)
EOSINOPHIL NFR BLD: 0.7 % (ref 0–7)
ERYTHROCYTE [DISTWIDTH] IN BLOOD BY AUTOMATED COUNT: 13.9 % (ref 11.5–14.5)
GLOBULIN SER CALC-MCNC: 2.8 G/DL (ref 2–4)
GLUCOSE SERPL-MCNC: 84 MG/DL (ref 65–100)
HCT VFR BLD AUTO: 32 % (ref 35–47)
HGB BLD-MCNC: 9.9 G/DL (ref 11.5–16)
IMM GRANULOCYTES # BLD AUTO: 0.01 K/UL (ref 0–0.04)
IMM GRANULOCYTES NFR BLD AUTO: 0.2 % (ref 0–0.5)
LYMPHOCYTES # BLD: 1.07 K/UL (ref 0.8–3.5)
LYMPHOCYTES NFR BLD: 26 % (ref 12–49)
MCH RBC QN AUTO: 26.8 PG (ref 26–34)
MCHC RBC AUTO-ENTMCNC: 30.9 G/DL (ref 30–36.5)
MCV RBC AUTO: 86.5 FL (ref 80–99)
MONOCYTES # BLD: 0.35 K/UL (ref 0–1)
MONOCYTES NFR BLD: 8.5 % (ref 5–13)
NEUTS SEG # BLD: 2.62 K/UL (ref 1.8–8)
NEUTS SEG NFR BLD: 63.6 % (ref 32–75)
NRBC # BLD: 0 K/UL (ref 0–0.01)
NRBC BLD-RTO: 0 PER 100 WBC
PLATELET # BLD AUTO: 350 K/UL (ref 150–400)
PMV BLD AUTO: 11.5 FL (ref 8.9–12.9)
POTASSIUM SERPL-SCNC: 4.1 MMOL/L (ref 3.5–5.1)
PROT SERPL-MCNC: 6.6 G/DL (ref 6.4–8.2)
RBC # BLD AUTO: 3.7 M/UL (ref 3.8–5.2)
SODIUM SERPL-SCNC: 141 MMOL/L (ref 136–145)
WBC # BLD AUTO: 4.1 K/UL (ref 3.6–11)

## 2025-05-15 PROCEDURE — 1159F MED LIST DOCD IN RCRD: CPT | Performed by: FAMILY MEDICINE

## 2025-05-15 PROCEDURE — G8419 CALC BMI OUT NRM PARAM NOF/U: HCPCS | Performed by: FAMILY MEDICINE

## 2025-05-15 PROCEDURE — 99214 OFFICE O/P EST MOD 30 MIN: CPT | Performed by: FAMILY MEDICINE

## 2025-05-15 PROCEDURE — 1090F PRES/ABSN URINE INCON ASSESS: CPT | Performed by: FAMILY MEDICINE

## 2025-05-15 PROCEDURE — G8399 PT W/DXA RESULTS DOCUMENT: HCPCS | Performed by: FAMILY MEDICINE

## 2025-05-15 PROCEDURE — 3078F DIAST BP <80 MM HG: CPT | Performed by: FAMILY MEDICINE

## 2025-05-15 PROCEDURE — G8427 DOCREV CUR MEDS BY ELIG CLIN: HCPCS | Performed by: FAMILY MEDICINE

## 2025-05-15 PROCEDURE — 1123F ACP DISCUSS/DSCN MKR DOCD: CPT | Performed by: FAMILY MEDICINE

## 2025-05-15 PROCEDURE — 3077F SYST BP >= 140 MM HG: CPT | Performed by: FAMILY MEDICINE

## 2025-05-15 PROCEDURE — 1036F TOBACCO NON-USER: CPT | Performed by: FAMILY MEDICINE

## 2025-05-15 NOTE — PROGRESS NOTES
Have you been to the ER, urgent care clinic since your last visit?  Hospitalized since your last visit?   NO    Have you seen or consulted any other health care providers outside our system since your last visit?   Neurologist/ Dr. Muir

## 2025-05-16 ENCOUNTER — RESULTS FOLLOW-UP (OUTPATIENT)
Age: 75
End: 2025-05-16

## 2025-05-16 DIAGNOSIS — D64.9 ANEMIA, UNSPECIFIED TYPE: Primary | ICD-10-CM

## 2025-05-16 NOTE — TELEPHONE ENCOUNTER
Patient notified of results and recommendations from Sallie Dumont MD [facility] per Sallie Dumont MD    Patient states understanding    Pt. Will come in Monday to get lab work done

## 2025-05-16 NOTE — TELEPHONE ENCOUNTER
----- Message from Dr. Sallie Dumont MD sent at 5/16/2025 11:35 AM EDT -----  Please inform patient that her CBC reveals a new anemia.  She will need to come in for additional labs to help clarify the cause of her anemia.  Please see  differential lab orders.

## 2025-05-19 ENCOUNTER — LAB (OUTPATIENT)
Age: 75
End: 2025-05-19

## 2025-05-19 DIAGNOSIS — D64.9 ANEMIA, UNSPECIFIED TYPE: ICD-10-CM

## 2025-05-19 NOTE — PROGRESS NOTES
SUBJECTIVE:   Ms. Debbie Cox is a 74 y.o. female who is here for follow up of routine medical issues.        Pt came in for a follow up of her memory concerns. She had a nueropsych evaluation with Dr. Michael Muir 2025 and 2025. He felt as though she has insomnia, grief response, and a very mild cognitive impairment. He recommended she  follow up with a neurologist for memory medication. She should be reevaluated in 12-18 months. She is currently taking  aricept and namenda. She would like to see Dr.Thomas Palacio (neurology). She has difficulty sleeping and wakes up between 12-2 am. She usually wakes up around 7 am for the day.        At this time, she is otherwise doing well and has brought no other complaints to my attention today.  For a list of the medical issues addressed today, see the assessment and plan below.    PMH:   Past Medical History:   Diagnosis Date    Hypertension     Old MI (myocardial infarction)     old anteroseptal infarct per EKG saw cardio 2015 negative testing    Urinary incontinence      PSH:  has a past surgical history that includes Knee arthroscopy (Left); other surgical history; Bunionectomy (Right); and  section.    All: has no known allergies.   MEDS:   Current Outpatient Medications   Medication Sig    amLODIPine (NORVASC) 2.5 MG tablet TAKE 1 TABLET BY MOUTH EVERY DAY    donepezil (ARICEPT) 10 MG tablet TAKE 1 TABLET BY MOUTH DAILY (BEFORE DINNER)    pantoprazole (PROTONIX) 40 MG tablet TAKE 1 TABLET BY MOUTH EVERY DAY BEFORE BREAKFAST    memantine (NAMENDA) 5 MG tablet Take 1 tablet by mouth 2 times daily    Multiple Vitamins-Minerals (THERAPEUTIC MULTIVITAMIN-MINERALS) tablet Take 1 tablet by mouth daily    vitamin D (CHOLECALCIFEROL) 25 MCG (1000 UT) TABS tablet Take by mouth daily     No current facility-administered medications for this visit.       FH: family history includes Alzheimer's Disease in her maternal aunt, maternal grandmother, and mother;

## 2025-05-20 LAB
FERRITIN SERPL-MCNC: 9 NG/ML (ref 26–388)
FOLATE SERPL-MCNC: 20.7 NG/ML (ref 5–21)
IRON SATN MFR SERPL: 8 % (ref 20–50)
IRON SERPL-MCNC: 36 UG/DL (ref 35–150)
TIBC SERPL-MCNC: 460 UG/DL (ref 250–450)
VIT B12 SERPL-MCNC: 903 PG/ML (ref 193–986)

## 2025-06-04 ENCOUNTER — RESULTS FOLLOW-UP (OUTPATIENT)
Age: 75
End: 2025-06-04

## 2025-06-07 DIAGNOSIS — F03.B0 MODERATE DEMENTIA WITHOUT BEHAVIORAL DISTURBANCE, PSYCHOTIC DISTURBANCE, MOOD DISTURBANCE, OR ANXIETY, UNSPECIFIED DEMENTIA TYPE (HCC): ICD-10-CM

## 2025-06-10 RX ORDER — MEMANTINE HYDROCHLORIDE 5 MG/1
5 TABLET ORAL 2 TIMES DAILY
Qty: 180 TABLET | Refills: 1 | Status: SHIPPED | OUTPATIENT
Start: 2025-06-10

## 2025-08-12 ENCOUNTER — OFFICE VISIT (OUTPATIENT)
Age: 75
End: 2025-08-12
Payer: MEDICARE

## 2025-08-12 VITALS
DIASTOLIC BLOOD PRESSURE: 70 MMHG | WEIGHT: 146.4 LBS | SYSTOLIC BLOOD PRESSURE: 138 MMHG | RESPIRATION RATE: 17 BRPM | HEIGHT: 64 IN | BODY MASS INDEX: 25 KG/M2 | TEMPERATURE: 97.8 F | OXYGEN SATURATION: 100 % | HEART RATE: 61 BPM

## 2025-08-12 DIAGNOSIS — R41.3 DISTURBANCE OF MEMORY: ICD-10-CM

## 2025-08-12 DIAGNOSIS — G31.09 FRONTO-TEMPORAL DEMENTIA (HCC): ICD-10-CM

## 2025-08-12 DIAGNOSIS — I67.89 CEREBRAL MICROVASCULAR DISEASE: ICD-10-CM

## 2025-08-12 DIAGNOSIS — F02.80 FRONTO-TEMPORAL DEMENTIA (HCC): ICD-10-CM

## 2025-08-12 DIAGNOSIS — I65.23 BILATERAL CAROTID ARTERY STENOSIS: Primary | ICD-10-CM

## 2025-08-12 PROCEDURE — 1126F AMNT PAIN NOTED NONE PRSNT: CPT | Performed by: PSYCHIATRY & NEUROLOGY

## 2025-08-12 PROCEDURE — 1160F RVW MEDS BY RX/DR IN RCRD: CPT | Performed by: PSYCHIATRY & NEUROLOGY

## 2025-08-12 PROCEDURE — G8399 PT W/DXA RESULTS DOCUMENT: HCPCS | Performed by: PSYCHIATRY & NEUROLOGY

## 2025-08-12 PROCEDURE — 99214 OFFICE O/P EST MOD 30 MIN: CPT | Performed by: PSYCHIATRY & NEUROLOGY

## 2025-08-12 PROCEDURE — 1090F PRES/ABSN URINE INCON ASSESS: CPT | Performed by: PSYCHIATRY & NEUROLOGY

## 2025-08-12 PROCEDURE — 1123F ACP DISCUSS/DSCN MKR DOCD: CPT | Performed by: PSYCHIATRY & NEUROLOGY

## 2025-08-12 PROCEDURE — G8419 CALC BMI OUT NRM PARAM NOF/U: HCPCS | Performed by: PSYCHIATRY & NEUROLOGY

## 2025-08-12 PROCEDURE — 1159F MED LIST DOCD IN RCRD: CPT | Performed by: PSYCHIATRY & NEUROLOGY

## 2025-08-12 PROCEDURE — G8427 DOCREV CUR MEDS BY ELIG CLIN: HCPCS | Performed by: PSYCHIATRY & NEUROLOGY

## 2025-08-12 PROCEDURE — 1036F TOBACCO NON-USER: CPT | Performed by: PSYCHIATRY & NEUROLOGY

## 2025-08-12 RX ORDER — MEMANTINE HYDROCHLORIDE 10 MG/1
10 TABLET ORAL 2 TIMES DAILY
Qty: 180 TABLET | Refills: 2 | Status: SHIPPED | OUTPATIENT
Start: 2025-08-12

## 2025-08-12 ASSESSMENT — PATIENT HEALTH QUESTIONNAIRE - PHQ9
SUM OF ALL RESPONSES TO PHQ QUESTIONS 1-9: 0
1. LITTLE INTEREST OR PLEASURE IN DOING THINGS: NOT AT ALL
2. FEELING DOWN, DEPRESSED OR HOPELESS: NOT AT ALL
SUM OF ALL RESPONSES TO PHQ QUESTIONS 1-9: 0

## 2025-08-20 ENCOUNTER — TELEPHONE (OUTPATIENT)
Age: 75
End: 2025-08-20

## 2025-08-20 DIAGNOSIS — G31.09 FRONTO-TEMPORAL DEMENTIA (HCC): ICD-10-CM

## 2025-08-20 DIAGNOSIS — F02.80 FRONTO-TEMPORAL DEMENTIA (HCC): ICD-10-CM

## 2025-08-20 RX ORDER — MEMANTINE HYDROCHLORIDE 5 MG/1
5 TABLET ORAL 2 TIMES DAILY
Qty: 180 TABLET | Refills: 1 | Status: CANCELLED | OUTPATIENT
Start: 2025-08-20

## 2025-08-28 ENCOUNTER — HOSPITAL ENCOUNTER (OUTPATIENT)
Facility: HOSPITAL | Age: 75
Discharge: HOME | End: 2025-08-31
Attending: PSYCHIATRY & NEUROLOGY
Payer: MEDICARE

## 2025-08-28 DIAGNOSIS — F02.80 FRONTO-TEMPORAL DEMENTIA (HCC): ICD-10-CM

## 2025-08-28 DIAGNOSIS — G31.09 FRONTO-TEMPORAL DEMENTIA (HCC): ICD-10-CM

## 2025-08-28 PROCEDURE — 6360000004 HC RX CONTRAST MEDICATION: Performed by: PSYCHIATRY & NEUROLOGY

## 2025-08-28 PROCEDURE — 70553 MRI BRAIN STEM W/O & W/DYE: CPT

## 2025-08-28 PROCEDURE — A9579 GAD-BASE MR CONTRAST NOS,1ML: HCPCS | Performed by: PSYCHIATRY & NEUROLOGY

## 2025-08-28 RX ORDER — GADOTERIDOL 279.3 MG/ML
13 INJECTION INTRAVENOUS
Status: COMPLETED | OUTPATIENT
Start: 2025-08-28 | End: 2025-08-28

## 2025-08-28 RX ADMIN — GADOTERIDOL 13 ML: 279.3 INJECTION, SOLUTION INTRAVENOUS at 15:03

## 2025-08-30 ENCOUNTER — CLINICAL DOCUMENTATION (OUTPATIENT)
Age: 75
End: 2025-08-30

## (undated) DEVICE — SUT ETHLN 3-0 18IN PS2 BLK --

## (undated) DEVICE — 3M™ TEGADERM™ TRANSPARENT FILM DRESSING FRAME STYLE, 1624W, 2-3/8 IN X 2-3/4 IN (6 CM X 7 CM), 100/CT 4CT/CASE: Brand: 3M™ TEGADERM™

## (undated) DEVICE — SOLUTION LACTATED RINGERS INJECTION USP

## (undated) DEVICE — ZIMMER® STERILE DISPOSABLE TOURNIQUET CUFF WITH PLC, DUAL PORT, SINGLE BLADDER, 18 IN. (46 CM)

## (undated) DEVICE — HAND I-LF: Brand: MEDLINE INDUSTRIES, INC.

## (undated) DEVICE — CONTINU-FLO SOLUTION SET, 2 INJECTION SITES, MALE LUER LOCK ADAPTER WITH RETRACTABLE COLLAR, LARGE BORE STOPCOCK WITH ROTATING MALE LUER LOCK EXTENSION SET, 2 INJECTION SITES, MALE LUER LOCK ADAPTER WITH RETRACTABLE COLLAR: Brand: INTERLINK/CONTINU-FLO

## (undated) DEVICE — KENDALL DL ECG CABLE AND LEAD WIRE SYSTEM, 3-LEAD, SINGLE PATIENT USE: Brand: KENDALL

## (undated) DEVICE — DRESSING,GAUZE,XEROFORM,CURAD,1"X8",ST: Brand: CURAD

## (undated) DEVICE — STERILE POLYISOPRENE POWDER-FREE SURGICAL GLOVES WITH EMOLLIENT COATING: Brand: PROTEXIS

## (undated) DEVICE — PREP SKN CHLRAPRP APL 26ML STR --

## (undated) DEVICE — BNDG ELAS HK LOOP 2X5YD NS -- MATRIX

## (undated) DEVICE — BIPOLAR FORCEPS CORD: Brand: VALLEYLAB

## (undated) DEVICE — INFECTION CONTROL KIT SYS

## (undated) DEVICE — SOL IRR SOD CL 0.9% 1000ML BTL --

## (undated) DEVICE — STERILE POLYISOPRENE POWDER-FREE SURGICAL GLOVES: Brand: PROTEXIS

## (undated) DEVICE — COVER LT HNDL PLAS RIG 1 PER PK